# Patient Record
Sex: FEMALE | Race: WHITE | NOT HISPANIC OR LATINO | Employment: FULL TIME | URBAN - METROPOLITAN AREA
[De-identification: names, ages, dates, MRNs, and addresses within clinical notes are randomized per-mention and may not be internally consistent; named-entity substitution may affect disease eponyms.]

---

## 2024-06-18 ENCOUNTER — TELEPHONE (OUTPATIENT)
Dept: NEUROSURGERY | Age: 29
End: 2024-06-18

## 2024-06-28 ENCOUNTER — TELEPHONE (OUTPATIENT)
Dept: NEUROLOGY | Facility: CLINIC | Age: 29
End: 2024-06-28

## 2024-07-10 ENCOUNTER — TELEPHONE (OUTPATIENT)
Dept: SURGERY | Facility: CLINIC | Age: 29
End: 2024-07-10

## 2024-07-10 ENCOUNTER — OFFICE VISIT (OUTPATIENT)
Dept: SURGERY | Facility: CLINIC | Age: 29
End: 2024-07-10
Payer: COMMERCIAL

## 2024-07-10 VITALS
DIASTOLIC BLOOD PRESSURE: 82 MMHG | WEIGHT: 293 LBS | SYSTOLIC BLOOD PRESSURE: 130 MMHG | BODY MASS INDEX: 45.99 KG/M2 | HEART RATE: 97 BPM | HEIGHT: 67 IN

## 2024-07-10 DIAGNOSIS — M54.16 LUMBAR RADICULOPATHY: Primary | ICD-10-CM

## 2024-07-10 RX ORDER — CYCLOBENZAPRINE HCL 10 MG
10 TABLET ORAL
COMMUNITY
Start: 2024-07-05 | End: 2024-07-12

## 2024-07-10 RX ORDER — GABAPENTIN 300 MG/1
1 CAPSULE ORAL 3 TIMES DAILY
COMMUNITY
Start: 2024-06-17

## 2024-07-10 RX ORDER — BUPROPION HYDROCHLORIDE 150 MG/1
150 TABLET, EXTENDED RELEASE ORAL 2 TIMES DAILY
COMMUNITY
Start: 2023-04-01

## 2024-07-10 NOTE — PROGRESS NOTES
[facility]  Neurosurgery Consultation  July 10, 2024    Be Bueno PCP:  Leonel West MD    1995 MRN [id number]     Reason for Visit: RLE radiculopathy     HPI     Be Bueno is a very pleasant 28 year old female who presents today for Neurosurgical consultation for RLE radiculopathy since the beginning of April.  Patient has no weakness but has severe pain and tingling to the RLE, from the hip into the toes. She has numbness to the R lateral calf and dorsum of the R foot. She has a tough time with her daily activities and has had a hard time at work.  She has no weakness. She has no bowel/bladder habit changes.  No prior spine surgeries, PT, or pain services.    MRI shows a large disc bulge at L4-5 with extruded disc with caudal migration.  There is also a large disc bulge with extruded disc at L5-S1 as well.    HISTORY     History reviewed. No pertinent past medical history.   History reviewed. No pertinent surgical history.   History reviewed. No pertinent family history.   Social History     Socioeconomic History    Marital status: Single   Tobacco Use    Smoking status: Every Day     Types: Cigarettes    Smokeless tobacco: Never   Substance and Sexual Activity    Alcohol use: Not Currently    Drug use: Never      No Known Allergies     CURRENT MEDICATIONS     Current Outpatient Medications   Medication Sig Dispense Refill    gabapentin 300 MG Oral Cap Take 1 capsule (300 mg total) by mouth 3 (three) times daily.      cyclobenzaprine 10 MG Oral Tab Take 1 tablet (10 mg total) by mouth.      buPROPion  MG Oral Tablet 12 Hr Take 1 tablet (150 mg total) by mouth 2 (two) times daily.          REVIEW OF SYSTEMS     Comprehensive review of systems done. Negative except what is outlined in the above HPI.     PHYSICAL EXAMIMATION     VITALS:  height is 67\" and weight is 300 lb (136.1 kg). Her blood pressure is 130/82 and her pulse is 97.     GENERAL: No apparent distress,  non-toxic appearing. Sitting comfortably in the examination chair.     MUSCULOSKELETAL: Even tone. Moving bilateral upper and lower extremities spontaneously and against resistance  + (R) SLR,   Pain on Lumbar Flexion:  +  Pain on Lumbar Extension:  +  Pain on Lumbar Rotation:  +     NEURO: Awake, alert and oriented x3. Gait intact, non-antalgic. Intact toe and heel balance on one foot bilaterally, reports equal strength. Able to deep knee bend on one foot bilaterally, reports equal strength. Sensation to light touch on bilateral upper and lower extremities intact.   LOWER EXTREMITY STRENGTH:    Iliospoas  Hamstrings  Quads  D-flexion  P-flexion EHL     Right 5 5 5 5 5 5     Left 5 5 5 5 5 5      DTRs:       Biceps    Triceps   Brachioradialis     Patellar     Ankle    Right       2+         2+            2+         2+        2+    Left       2+         2+             2+         2+        2+      IMAGING:     MRI lumbar spine available in PACS     MEDICAL DECISION MAKING:     ASSESSMENT:  1. Lumbar radiculopathy      PLAN:   Recommend R L4-5 and L5-S1 NETTA    I reviewed the plan of care with the patient at length. All questions and concerns were addressed at this time. The patient verbalized understanding, agrees with the plan, and was appreciative.    Total visit time: 45 minutes; More than 50% spent coordinating care, counseling, reviewing imaging and discussing medication therapy.       Steffi Glynn, MSN, APRN, FNP-[facility]  Neurosurgery   [address] [place] [address], [address]  [place] [address]  [phone]

## 2024-07-10 NOTE — TELEPHONE ENCOUNTER
Pt states she only has ibuprofen for pain control and it isn't helping.  She is asking if something can be prescribed to get her to surgery on 8.6.24.  If appropriate, pls send to: [facility] #[id number] - [address], [phone], [phone] [[id number]]

## 2024-07-10 NOTE — PATIENT INSTRUCTIONS
Refill policies:    Allow 2-3 business days for refills; controlled substances may take longer.  Contact your pharmacy at least 5 days prior to running out of medication and have them send an electronic request or submit request through the “request refill” option in your [employer] account.  Refills are not addressed on weekends; covering physicians do not authorize routine medications on weekends.  No narcotics or controlled substances are refilled after noon on Fridays or by on call physicians.  By law, narcotics must be electronically prescribed.  A 30 day supply with no refills is the maximum allowed.  If your prescription is due for a refill, you may be due for a follow up appointment.  To best provide you care, patients receiving routine medications need to be seen at least once a year.  Patients receiving narcotic/controlled substance medications need to be seen at least once every 3 months.  In the event that your preferred pharmacy does not have the requested medication in stock (e.g. Backordered), it is your responsibility to find another pharmacy that has the requested medication available.  We will gladly send a new prescription to that pharmacy at your request.    Scheduling Tests:    If your physician has ordered radiology tests such as MRI or CT scans, please contact Central Scheduling at [phone] right away to schedule the test.  Once scheduled, the [facility] Centralized Referral Team will work with your insurance carrier to obtain pre-certification or prior authorization.  Depending on your insurance carrier, approval may take 3-10 days.  It is highly recommended patients assure they have received an authorization before having a test performed.  If test is done without insurance authorization, patient may be responsible for the entire amount billed.      Precertification and Prior Authorizations:  If your physician has recommended that you have a procedure or additional testing performed the [facility]  Centralized Referral Team will contact your insurance carrier to obtain pre-certification or prior authorization.    You are strongly encouraged to contact your insurance carrier to verify that your procedure/test has been approved and is a COVERED benefit.  Although the [facility] Centralized Referral Team does its due diligence, the insurance carrier gives the disclaimer that \"Although the procedure is authorized, this does not guarantee payment.\"    Ultimately the patient is responsible for payment.   Thank you for your understanding in this matter.  Paperwork Completion:  If you require FMLA or disability paperwork for your recovery, please make sure to either drop it off or have it faxed to our office at [phone]. Be sure the form has your name and date of birth on it.  The form will be faxed to our Forms Department and they will complete it for you.  There is a 25$ fee for all forms that need to be filled out.  Please be aware there is a 10-14 day turnaround time.  You will need to sign a release of information (CALVIN) form if your paperwork does not come with one.  You may call the Forms Department with any questions at [phone].  Their fax number is [phone].     You are scheduled for Right L4-S1 microdiscectomy on 8.6.24 with  at [facility].     PCP clearance is needed.  We have faxed a request for pre-op clearance to your PCP Dr West. Please contact their office for appointment.      You will need  pre operative labs which will include MRSA/MSSA testing.  You will need to contact the Pre-admission department at [phone] to schedule an appointment for your labs.     The Pre-Admission nurse will review your health history and give you information from the hospital. The nurse will also get you scheduled for all your pre-op testing required for your surgery.     Do not take any blood thinning medications such as over the counter non-steroidal antiinflammatories (advil, aleve, ibuprofen  etc.), herbal supplements (garlic,tumeric etc.), vitamin E, fish oil or krill oil for at least 7 days prior to surgery. You may only take Tylenol, Extra Strength Tylenol, Arthritis Tylenol, or prescription Norco or Tramadol for pain if something is needed.    You should have nothing to eat or drink after 11:00pm the night prior to surgery except for the following:    Do drink 12 ounces of regular Gatorade (NOT RED) 12 hours and 4 hours prior to your scheduled surgery time, Do not drink any other liquids (including water) before your surgery. Do not chew gum or eat candies before surgery.  Take 1000 mg of Tylenol (Acetaminophen) 4 hours before your scheduled surgery time, take this with your scheduled Gatorade.    In order to prevent infection, you will need to purchase Hibiclens soap and use it after your regular body soap for 5 days prior to your procedure.  The last shower should be the night before surgery.  This soap can be found at any pharmacy in the first aid section. See detailed instructions below.      Our office will get prior authorization for surgery through your insurance.     Surgery is usually scheduled as a 23 hour admission.  This is an estimate and varies from person to person.  Ultimately, the surgeon will determine when you are ready to be discharged.    The hospital will contact you 1-2 days before surgery with your arrival time.     If you were on blood thinners (such as Coumadin, Pradaxa, Xarelto, etc) prior to surgery that we had you stop for surgery, please make sure you get instructions about when to resume the medication before you are discharged from the hospital.    Per Dr. Byrne's surgery protocol your appointments are as follows:     2 week pre-op surgical telehealth appointment is on 7.23.24 at 10:30 am with Kiera Norwood RN     1 week pre-op surgical review scheduled on 7.31.24 at 3:40 pm with Dr. Byrne.     1 week post-op appointment scheduled on 8.14.24 at 2:00 pm with Steffi  MARIA M Glynn     4 week post-op appointment scheduled on 9.4.24 at 3:20 om with Dr. Byrne     3 month post surgery appointment scheduled on tbd at tbd with MARIA M Abdullahi       Restrictions for after surgery:  Lifting restriction of 10 pounds or less.  No bending or twisting.  No prolonged sitting or standing.  Driving is restricted for the first 1-2 weeks (this will vary from surgeon to surgeon.)  Fusion patients may require bracing such as TLSO or LSO brace. Braces are custom made to fit the patient.  Patient's are to keep their incisions covered for the first 3 days post surgery. After that they may leave the incision open to air. It is better for the healing process if the incision is left open to air.   May shower after the third day.  Do not scrub the incision and avoid any lotions or creams to the incision.    Please make sure to arrive at least 15 minutes prior to your scheduled appointment in order to get checked in and roomed in a timely manner.  Depending on provider availability, late patients may be required to reschedule.  REFILLS:  After surgery, please remember that we do have a 48 hour refill policy that does not include weekends, please make sure to request your medications in a timely manner so that you do not go days without medication.  *Refills should be requested through your pharmacy or through the refill request in [employer] (log in, go to medications, then select refill request).  [employer] MESSAGING:  Please remember that our office is closed during the weekend and no one is available for [employer] messages. If you have an urgent or emergent matter please go to a walk-in center or the emergency room. Also please remember your [employer] messages are part of your legal medical record and should not be utilized as a personal email with our providers as it is visible to all [facility] employees. Also, Since [employer] messages are not for emergent matters it may be several days before there is a  response to your message.  FMLA/PAPERWORK COMPLETED BY OUR MEDICAL FORMS DEPARTMENT:  If you require FMLA paperwork for your surgery, please make sure to either Drop it off or have it faxed to our office at [phone]. Make sure it has your NAME, , and has your signature. You will need to have a Release of Information on file. To facilitate this process we ask that you requested it at the  on your way out and sign it. Without a signed CALVIN or signature on the form we will not be able to fax it and this will cause a delay with your forms. Fees charged for forms are $25 for initial submission and $15 for recertifications. If you have questions on the status of your forms please call the forms department at [phone].  **We do have a 3 week policy for all forms and paperwork, please make sure to allow plenty of time for completion. Same day paperwork will not be completed. **    Spine Navigator  You will have a 30 minute telehealth visit with our spine navigator approximately 2 weeks before your surgery. This visit is NOT optional. This appointment will get booked when you schedule your spinal surgery.  When speaking with Pre-admissions you will be told about a spine class as it relates to your surgery, this is an OPTIONAL class. The same or similar information will be discussed with you during your telehealth appointment with the spine navigator (Spine Navigator appointment is not optional). However, if you would like to have this information repeated to you or if you would feel more comfortable with additional information, you can elect to schedule this class during your pre-admissions phone call.  The Pre-op Spine Surgery Class is held at [facility] most  from 3:30-4:30 pm. Call Pre-admission Testing (PAT) to register at:  [phone]. Please park in the [facility] Parking garage, check in at registration and meet in the [facility] lobby for your escort to class on the Ortho Spine unit. If  unable to attend, but would like additional information, the class is available online at [email or web address].     Regarding current visitor information:    Please visit the following website for the detailed and up-to-date visitor screening and restriction policy at [facility] [email or web address].    Hibiclens Bathing  Hibiclens is a body soap that is used before surgery protect you from getting an infection after surgery   Hibiclens comes in a large blue bottle and can be found in most pharmacies in the First Aid supplies  Shower with this daily for FIVE consecutive days before surgery, using the entire bottle over the five days.  The last shower should be the night before surgery.   Steps to bathing with Hibiclens  Do not use Hibiclens on your hair, face or private areas  Wash your hair and face as normal with your usual cleansers  Rinse well  Using a clean wet washcloth apply enough Hibiclens to cover your body. Wash from the neck down avoiding the genital areas and concentrating on the surgical area  Rinse well  Dry yourself with a clean, dry towel  Do not use any powders, creams, lotions or sprays on your body as these attract bacteria  Deodorant and facial creams are acceptable.   (Laundering/cleaning: Chlorhexidine gluconate skin cleansers will cause stains if used with chlorine releasing products. Rinse completely and use only non-chlorine detergents.)    For Office Use Only:    Medical Clearances Needed: PCP  PA: IAN  CPT Codes:

## 2024-07-10 NOTE — PROGRESS NOTES
New patient:  Reason for visit:   R sided low back pain, R leg pain with numbness/tingling on R foot    Numeric Rating Scale:        Pain at Present:  3/10                                                                                                              Past Treatments for Current Pain Condition:     PT-no relief   Norco, oral steroids- no relief      Prior diagnostic testing related to this condition:    MRI spine lumbar DOS 06/2024 uploaded to pacs

## 2024-07-11 ENCOUNTER — TELEPHONE (OUTPATIENT)
Dept: SURGERY | Facility: CLINIC | Age: 29
End: 2024-07-11

## 2024-07-11 DIAGNOSIS — M54.16 LUMBAR RADICULOPATHY: Primary | ICD-10-CM

## 2024-07-11 RX ORDER — GABAPENTIN 100 MG/1
100 CAPSULE ORAL 3 TIMES DAILY
Qty: 30 CAPSULE | Refills: 0 | Status: SHIPPED | OUTPATIENT
Start: 2024-07-11 | End: 2024-07-15 | Stop reason: DRUGHIGH

## 2024-07-11 RX ORDER — CYCLOBENZAPRINE HCL 10 MG
10 TABLET ORAL 2 TIMES DAILY PRN
Qty: 30 TABLET | Refills: 0 | Status: SHIPPED | OUTPATIENT
Start: 2024-07-11 | End: 2024-07-12

## 2024-07-11 RX ORDER — MELOXICAM 7.5 MG/1
15 TABLET ORAL DAILY
Qty: 30 TABLET | Refills: 0 | Status: SHIPPED | OUTPATIENT
Start: 2024-07-11 | End: 2024-07-12

## 2024-07-11 NOTE — TELEPHONE ENCOUNTER
The following medications have been sent to patient's preferred [facility] pharmacy:    Gabapentin 100 mg TID. Can increase to 200 mg TID after 3 days if no improvement.  Meloxicam 15 mg (2 tablets) daily. Recommend taking medication with breakfast. Patient should avoid taking other NSAIDSS while taking this medication.  Cyclobenzaprine BID PRN. Patient should not drive or operate machinery until seeing how this medication affects her, as it can cause drowsiness.    Please advise patient of the above. Thank you!

## 2024-07-11 NOTE — TELEPHONE ENCOUNTER
You are scheduled for Right L4-S1 microdiscectomy on 8.6.24 with  at [facility].     PCP clearance is needed.  We have faxed a request for pre-op clearance to your PCP Dr West. Please contact their office for appointment.      You will need  pre operative labs which will include MRSA/MSSA testing.  You will need to contact the Pre-admission department at [phone] to schedule an appointment for your labs.     The Pre-Admission nurse will review your health history and give you information from the hospital. The nurse will also get you scheduled for all your pre-op testing required for your surgery.     Do not take any blood thinning medications such as over the counter non-steroidal antiinflammatories (advil, aleve, ibuprofen etc.), herbal supplements (garlic,tumeric etc.), vitamin E, fish oil or krill oil for at least 7 days prior to surgery. You may only take Tylenol, Extra Strength Tylenol, Arthritis Tylenol, or prescription Norco or Tramadol for pain if something is needed.    You should have nothing to eat or drink after 11:00pm the night prior to surgery except for the following:    Do drink 12 ounces of regular Gatorade (NOT RED) 12 hours and 4 hours prior to your scheduled surgery time, Do not drink any other liquids (including water) before your surgery. Do not chew gum or eat candies before surgery.  Take 1000 mg of Tylenol (Acetaminophen) 4 hours before your scheduled surgery time, take this with your scheduled Gatorade.    In order to prevent infection, you will need to purchase Hibiclens soap and use it after your regular body soap for 5 days prior to your procedure.  The last shower should be the night before surgery.  This soap can be found at any pharmacy in the first aid section. See detailed instructions below.      Our office will get prior authorization for surgery through your insurance.     Surgery is usually scheduled as a 23 hour admission.  This is an estimate and varies from  person to person.  Ultimately, the surgeon will determine when you are ready to be discharged.    The hospital will contact you 1-2 days before surgery with your arrival time.     If you were on blood thinners (such as Coumadin, Pradaxa, Xarelto, etc) prior to surgery that we had you stop for surgery, please make sure you get instructions about when to resume the medication before you are discharged from the hospital.    Per Dr. Byrne's surgery protocol your appointments are as follows:     2 week pre-op surgical telehealth appointment is on 7.23.24 at 10:30 am with Kiera Norwood RN     1 week pre-op surgical review scheduled on 7.31.24 at 3:40 pm with Dr. Byrne.     1 week post-op appointment scheduled on 8.14.24 at 2:00 pm with MARIA M Abdullahi     4 week post-op appointment scheduled on 9.4.24 at 3:20 om with Dr. Byrne     3 month post surgery appointment scheduled on tbd at tbd with MARIA M Abdullahi       Restrictions for after surgery:  Lifting restriction of 10 pounds or less.  No bending or twisting.  No prolonged sitting or standing.  Driving is restricted for the first 1-2 weeks (this will vary from surgeon to surgeon.)  Fusion patients may require bracing such as TLSO or LSO brace. Braces are custom made to fit the patient.  Patient's are to keep their incisions covered for the first 3 days post surgery. After that they may leave the incision open to air. It is better for the healing process if the incision is left open to air.   May shower after the third day.  Do not scrub the incision and avoid any lotions or creams to the incision.    Please make sure to arrive at least 15 minutes prior to your scheduled appointment in order to get checked in and roomed in a timely manner.  Depending on provider availability, late patients may be required to reschedule.  REFILLS:  After surgery, please remember that we do have a 48 hour refill policy that does not include weekends, please make sure to request your  medications in a timely manner so that you do not go days without medication.  *Refills should be requested through your pharmacy or through the refill request in [employer] (log in, go to medications, then select refill request).  [employer] MESSAGING:  Please remember that our office is closed during the weekend and no one is available for [employer] messages. If you have an urgent or emergent matter please go to a walk-in center or the emergency room. Also please remember your [employer] messages are part of your legal medical record and should not be utilized as a personal email with our providers as it is visible to all [facility] employees. Also, Since [employer] messages are not for emergent matters it may be several days before there is a response to your message.  FMLA/PAPERWORK COMPLETED BY OUR MEDICAL FORMS DEPARTMENT:  If you require FMLA paperwork for your surgery, please make sure to either Drop it off or have it faxed to our office at [phone]. Make sure it has your NAME, , and has your signature. You will need to have a Release of Information on file. To facilitate this process we ask that you requested it at the  on your way out and sign it. Without a signed CALVIN or signature on the form we will not be able to fax it and this will cause a delay with your forms. Fees charged for forms are $25 for initial submission and $15 for recertifications. If you have questions on the status of your forms please call the forms department at [phone].  **We do have a 3 week policy for all forms and paperwork, please make sure to allow plenty of time for completion. Same day paperwork will not be completed. **    Spine Navigator  You will have a 30 minute telehealth visit with our spine navigator approximately 2 weeks before your surgery. This visit is NOT optional. This appointment will get booked when you schedule your spinal surgery.  When speaking with Pre-admissions you will be told about a spine  class as it relates to your surgery, this is an OPTIONAL class. The same or similar information will be discussed with you during your telehealth appointment with the spine navigator (Spine Navigator appointment is not optional). However, if you would like to have this information repeated to you or if you would feel more comfortable with additional information, you can elect to schedule this class during your pre-admissions phone call.  The Pre-op Spine Surgery Class is held at [facility] most Wednesdays from 3:30-4:30 pm. Call Pre-admission Testing (PAT) to register at:  [phone]. Please park in the [facility] Parking garage, check in at registration and meet in the [facility] lobby for your escort to class on the Ortho Spine unit. If unable to attend, but would like additional information, the class is available online at [email or web address].     Regarding current visitor information:    Please visit the following website for the detailed and up-to-date visitor screening and restriction policy at [email or web address] [email or web address].    Hibiclens Bathing  Hibiclens is a body soap that is used before surgery protect you from getting an infection after surgery   Hibiclens comes in a large blue bottle and can be found in most pharmacies in the First Aid supplies  Shower with this daily for FIVE consecutive days before surgery, using the entire bottle over the five days.  The last shower should be the night before surgery.   Steps to bathing with Hibiclens  Do not use Hibiclens on your hair, face or private areas  Wash your hair and face as normal with your usual cleansers  Rinse well  Using a clean wet washcloth apply enough Hibiclens to cover your body. Wash from the neck down avoiding the genital areas and concentrating on the surgical area  Rinse well  Dry yourself with a clean, dry towel  Do not use any powders, creams, lotions or sprays on your body as these attract  bacteria  Deodorant and facial creams are acceptable.   (Laundering/cleaning: Chlorhexidine gluconate skin cleansers will cause stains if used with chlorine releasing products. Rinse completely and use only non-chlorine detergents.)    For Office Use Only:    Medical Clearances Needed: PCP  PA: IAN  CPT Codes:

## 2024-07-11 NOTE — TELEPHONE ENCOUNTER
Message from Provider received.    Advised pt. with Providers message.     Message was sent through [employer] message.

## 2024-07-11 NOTE — TELEPHONE ENCOUNTER
Message noted regarding prescription request.    LOV 07/10/24. Note not complete yet.     Appointment note stated Pt. has lumbar radiculopathy. L4/L5 descending, severe canal stenosis.     Routed to MARIA M Bales.

## 2024-07-12 RX ORDER — METHYLPREDNISOLONE 4 MG/1
TABLET ORAL
Qty: 1 EACH | Refills: 0 | Status: SHIPPED | OUTPATIENT
Start: 2024-07-12

## 2024-07-12 RX ORDER — CYCLOBENZAPRINE HCL 10 MG
10 TABLET ORAL 3 TIMES DAILY PRN
Qty: 30 TABLET | Refills: 1 | Status: SHIPPED | OUTPATIENT
Start: 2024-07-12

## 2024-07-12 NOTE — TELEPHONE ENCOUNTER
Called patient to discuss medication therapy. She endorses nausea and no relief of pain with Meloxicam 15 mg daily. She has been taking gabapentin 300 mg TID to minimal relief. She does report relief with Flexeril TID and PRN Tramadol - has 50 mg tabs at home.     New Rx for Flexeril TID has been provided. I called patient's preferred [facility] pharmacy and confirmed receipt of the prescription. They have notified me that the will fill this script for her today.    Rx for medrol pack provided.    Recommend increasing gabapentin to 300 mg AM, 300 mg in afternoon, and 600 mg nightly. Patient has enough medication and declines need for refill at this time.     The patient was advised to present to the ED over the weekend if she experiences any new neurological symptoms or if her pain is intractable despite the medication changes above.  She voiced understanding and was very appreciative of the phone call.

## 2024-07-12 NOTE — TELEPHONE ENCOUNTER
Patient has messaged and responded that she has been taking 300 mg Gabapentin TID, and recommendations for medication order per MARIA M Bales is to increase Gabapentin strength to 200 mg TID.     Patient states Meloxicam \"does not work and gives me headaches and makes me nauseous\".    Patient also has Cyclobenzaprine ordered:    Medication Quantity Refills Start End   cyclobenzaprine 10 MG Oral Tab 30 tablet 0 7/11/2024 --   Sig:   Take 1 tablet (10 mg total) by mouth 2 (two) times daily as needed for Muscle spasms.     Route:   Oral     PRN Reason(s):   Muscle spasms       Per Dr. Byrne and MARIA M Abdullahi at OV on 7/10/24:      \"PLAN:   Recommend R L4-5 and L5-S1 NETTA           Progress Notes  Rita Taylor MA (Medical Assistant)  New patient:  Reason for visit:   R sided low back pain, R leg pain with numbness/tingling on R foot     Numeric Rating Scale:        Pain at Present:  3/10                                                                                                              Past Treatments for Current Pain Condition:     PT-no relief   Norco, oral steroids- no relief\"         Routed to MARIA M Bales.

## 2024-07-15 PROBLEM — M25.50 MULTIPLE JOINT PAIN: Status: ACTIVE | Noted: 2017-11-03

## 2024-07-15 PROBLEM — G89.29 CHRONIC MIDLINE LOW BACK PAIN WITH LEFT-SIDED SCIATICA: Status: ACTIVE | Noted: 2023-06-28

## 2024-07-15 PROBLEM — M54.42 CHRONIC MIDLINE LOW BACK PAIN WITH LEFT-SIDED SCIATICA: Status: ACTIVE | Noted: 2023-06-28

## 2024-07-15 PROBLEM — M54.41 ACUTE RIGHT-SIDED LOW BACK PAIN WITH RIGHT-SIDED SCIATICA: Status: ACTIVE | Noted: 2024-05-21

## 2024-07-15 PROBLEM — M54.16 LUMBAR RADICULOPATHY: Status: ACTIVE | Noted: 2023-06-28

## 2024-07-15 PROBLEM — O47.9: Status: ACTIVE | Noted: 2022-02-05

## 2024-07-15 PROBLEM — F32.A DEPRESSION: Status: ACTIVE | Noted: 2023-06-28

## 2024-07-15 NOTE — TELEPHONE ENCOUNTER
Patient is scheduled for Right L4-S1 microdiscectomy on 8.6.24 with  at [facility].        Y pre-op apt scheduled (if sx is more than 30 days from last apt)  Ypre-op apt scheduled with RN spine navigator  Y Surgical instructions reviewed by nursing staff with patient  Y  form completed  Y Surgery order signed   Y Placed sx on surgery sheet  Y Placed on outlook calendar  Y BRAINDIGITt message sent to patient with sx instructions  Y Faxed pre-op clearance request to PCP Dr. West   N/A Faxed letter to prescribing provider requesting anticoagulants be held for surgery  Y E-mail sent to [employer]  Y Post-op appointments made  Y. Routed to PA team to initiate.  Y Post-Op outreach pt reminder placed.   Y Entire Neurosurgery Checklist Completed    Clearances: PCP - NEEDED  PA: [employer] - NEEDED  CPT Codes: 78781, 83446.

## 2024-07-17 NOTE — TELEPHONE ENCOUNTER
Prior authorization request completed for: Right L4-S1 microdiscectomy    Authorization #[id number]  Authorization dates: 08/06/24  CPT codes approved: 64728, 01283  Number of visits/dates of service approved: Outpatient   Physician: Caty   Location: [facility]     Call Ref#: [id number]  Representative Name: Maxx CHAN  Insurance Carrier: [id number]

## 2024-07-17 NOTE — TELEPHONE ENCOUNTER
PCP presurgical clearance received per OV note dated 7.11.24 in chart, \"She is medically stable to proceed with the planned procedure  Further preoperative evaluation is not needed\"

## 2024-07-23 ENCOUNTER — HOSPITAL ENCOUNTER (OUTPATIENT)
Dept: GENERAL RADIOLOGY | Facility: HOSPITAL | Age: 29
Discharge: HOME | End: 2024-07-23
Attending: NEUROLOGICAL SURGERY
Payer: COMMERCIAL

## 2024-07-23 ENCOUNTER — LABORATORY ENCOUNTER (OUTPATIENT)
Dept: LAB | Facility: HOSPITAL | Age: 29
End: 2024-07-23
Attending: NEUROLOGICAL SURGERY
Payer: COMMERCIAL

## 2024-07-23 ENCOUNTER — NURSE ONLY (OUTPATIENT)
Dept: SURGERY | Facility: CLINIC | Age: 29
End: 2024-07-23
Payer: COMMERCIAL

## 2024-07-23 DIAGNOSIS — M54.16 LUMBAR RADICULOPATHY: ICD-10-CM

## 2024-07-23 DIAGNOSIS — Z71.9 ENCOUNTER FOR EDUCATION: Primary | ICD-10-CM

## 2024-07-23 LAB
ANTIBODY SCREEN: NEGATIVE
APTT PPP: 29.6 SECONDS (ref 23–36)
BILIRUB UR QL STRIP.AUTO: NEGATIVE
CLARITY UR REFRACT.AUTO: CLEAR
COLOR UR AUTO: COLORLESS
GLUCOSE UR STRIP.AUTO-MCNC: NORMAL MG/DL
INR BLD: 0.9 (ref 0.8–1.2)
KETONES UR STRIP.AUTO-MCNC: NEGATIVE MG/DL
LEUKOCYTE ESTERASE UR QL STRIP.AUTO: NEGATIVE
NITRITE UR QL STRIP.AUTO: NEGATIVE
PH UR STRIP.AUTO: 5.5 [PH] (ref 5–8)
PROT UR STRIP.AUTO-MCNC: NEGATIVE MG/DL
PROTHROMBIN TIME: 12.1 SECONDS (ref 11.6–14.8)
RBC UR QL AUTO: NEGATIVE
RH BLOOD TYPE: POSITIVE
SP GR UR STRIP.AUTO: 1.01 (ref 1–1.03)
UROBILINOGEN UR STRIP.AUTO-MCNC: NORMAL MG/DL

## 2024-07-23 PROCEDURE — 86901 BLOOD TYPING SEROLOGIC RH(D): CPT

## 2024-07-23 PROCEDURE — 86900 BLOOD TYPING SEROLOGIC ABO: CPT

## 2024-07-23 PROCEDURE — 85610 PROTHROMBIN TIME: CPT

## 2024-07-23 PROCEDURE — 86850 RBC ANTIBODY SCREEN: CPT

## 2024-07-23 PROCEDURE — 85730 THROMBOPLASTIN TIME PARTIAL: CPT

## 2024-07-23 PROCEDURE — 71046 X-RAY EXAM CHEST 2 VIEWS: CPT | Performed by: NEUROLOGICAL SURGERY

## 2024-07-23 PROCEDURE — 81003 URINALYSIS AUTO W/O SCOPE: CPT

## 2024-07-23 PROCEDURE — 36415 COLL VENOUS BLD VENIPUNCTURE: CPT

## 2024-07-23 RX ORDER — CYCLOBENZAPRINE HCL 10 MG
10 TABLET ORAL 3 TIMES DAILY PRN
Qty: 30 TABLET | Refills: 1 | Status: SHIPPED | OUTPATIENT
Start: 2024-07-23

## 2024-07-23 NOTE — TELEPHONE ENCOUNTER
Medication: Cyclobenzaprine 10 MG    Last Refill: 7/12/24    Last office visit: 7/10/24    Date of next office visit scheduled: 7/31/24    Last office visit note recommendation:  \"PLAN:   Recommend R L4-5 and L5-S1 NETTA\"

## 2024-07-23 NOTE — PROGRESS NOTES
RN Spine Navigator Education for [facility]     If you have received instructions from your surgeon that are different from those listed below, please follow your physician's instructions.    You are scheduled for a Microdiscectomy with Dr. Byrne on 8/6.      Patient Surgical Goals: Decreased right leg symptoms.     Before Your Surgery    Choose a Care Partner  Patient attended spine navigator visit independently.  Care partner is Jose Rowe. Your care partner(s) should be able to provide transportation to and from the hospital. They should be able to help at home for the first week after discharge, including helping you with meals, medication, and dressing changes.    Clearance Before Surgery  You will need to see your primary care provider within 30 days before surgery. Please make sure this appointment is at least a week before surgery as more testing or doctor visits may be ordered. Presurgical testing may include labs, nasal swab, imaging, EKG.   Make sure that you complete all preadmission testing so that surgery does not get delayed.    Home Environment  Assessed home status: home has stairs, bedroom and bathroom are on first floor, patient has dependents at home, and patient has pets. Suggested arrangements for childcare  and pet care.  It is recommended that you prepare your home by putting clean sheets on your bed, freezing meals, and putting frequently used items at waist level.   Prevent falls by removing items that could cause you to trip, adding nightlights and adding a nonskid mat in shower.  Assistive devices can be purchased at a medical supply store or online including canes, walkers, toileting devices (commodes, raised toilet seats, toilet paper wiping aid), long handled sponge, shower chair or tub transfer bench, grabber/reacher tool, sock aid, long handled shoehorn, if needed.    Tobacco, Nicotine and Marijuana Use   Educated on the importance of nicotine cessation and Smoking any amount or use  of nicotine products can delay or prevent healing. Do not use nicotine products for at least two weeks after surgery    Medications to Stop   For 7-10 days before surgery do not take any blood thinning medications. This includes non-steroidal anti-inflammatories or NSAIDs (Advil, Aleve, Motrin, ibuprofen, naproxen, meloxicam, diclofenac, celecoxib, etc.), aspirin (unless told otherwise by your cardiologist or surgeon), herbal supplements and vitamins (garlic, turmeric, vitamin E, fish oil or krill oil, etc.). You may only take Tylenol or prescribed narcotic medication if needed for pain.   Other medications to stop include: none    Leading Up to Day of Surgery  Five days before surgery wash with Hibiclens soap after your regular body soap every day. Do not put use Hibiclens on your face, hair or privates. Your last shower should be the night before surgery.  One-two business days before surgery, the preadmission testing staff will call you and let you know what time to arrive, where to park, when to take your Tylenol and Gatorade, and will provide any additional instructions.   After 11pm the day before surgery, do not eat or drink anything (including water, gum, or candies) except for Tylenol and Gatorade.   Drink 12 ounces of regular Gatorade (NOT RED) 12 hours prior to your scheduled surgery time. Drink your second 12 ounces of regular Gatorade and take 1000 mg of Tylenol (acetaminophen) 4 hours before your scheduled surgery time.     Items to Bring to the Hospital   Bring insurance card, ID, advanced directive, or medical power of  paperwork, loose fitting clothing, shoes with a back that can accommodate swollen feet, long phone charging cord.   Do not bring jewelry, valuables, or medications.   If you take an uncommon medication that the hospital may not have, it must be brought to the hospital in the original container, and you must notify the nurse of this medication.     In the Hospital     Operative  Day and Hospital Stay  In the preoperative area, you will change into a gown, have an IV placed in your hand or arm by the nurse, and sign any consent paperwork that is needed for your procedure. You will speak to the surgeon and anesthesiologist. It is important to tell the doctors and nurses if you have had any significant side effects from pain medications or anesthesia such as a rash or severe nausea.    In the operating room, the anesthesiologist will attach monitors, give you oxygen through a mask, and give you medicine through the IV to fall asleep. After you are sleeping, the breathing tube will be placed. The equipment will be hooked up and removed while you are asleep. You will wake up on the hospital bed.     During the surgery, your care partner can sit in the surgical waiting area. There are TV screens in that area that keep them informed of your progress. If the procedure is at [facility], there is a person who can speak to them about your surgical progress.     In the recovery room, monitors will be attached that check your heart rate, blood pressure and oxygen levels. While you may not remember this part, a nurse is with you and constantly checking on your condition. Medications for pain and nausea will be given if you need them. You may have a melo catheter to empty your bladder or a drain at your surgical site. Your family is not allowed in the recovery room. When you are ready to leave the recovery room, you will be transported on your bed to the inpatient unit accompanied by your family once a room is available.  On the inpatient unit, a team of doctors and advanced practice providers will manage your care. The spine care nurses will check your blood pressure, temperature, heartbeat, breathing, stomach sounds and your incision. They may assess the strength and sensation in your arms and legs. Medications that are given in the hospital include antibiotics, IV fluids, pain medications, muscle  relaxers, stool softeners, and your home medications. You may get blood drawn and another x-ray. Physical and occupational therapists may come to your room to teach you how to move around safely after surgery.     Post Op Plan   The average length of hospital stay is one to two days. A  may visit you to help arrange extra care for you once you leave the hospital. Occasionally, it is recommended that a home health nurse or therapist visit you in your home for a short time. The best place to recover is your own home, but if you need more assistance than home health and your care partner can provide, the  will help you and your family choose other facilities to help you recover your strength.    Preventing surgical complications  It is important to follow all instructions before and after surgery to decrease the risks of surgery and prevent complications.     Blood clots: walk, wear leg compression devices in the hospital, and do ankle pumps at home  Infection: wash with Hibiclens before surgery, wash your hands, don't touch your incision  Pneumonia: take deep breaths and cough and use the breathing exercise (incentive spirometer)   Nausea/vomiting: start with liquids and small meals and do not take pills on an empty stomach  Constipation: drink water and walk frequently    Tell your nurse if you are experiencing nausea, vomiting or constipation as they have medications to help treat these.      At home     Understanding Pain After Surgery  We will do our best to manage your pain after surgery, but it is not possible to be completely pain-free. There will be operative pain in your back or neck. Pain in the arms or legs may be one of the first things to improve. Numbness, weakness, and tingling should improve over time. However, there can be a temporary increase in symptoms in the first few days due to inflammation from surgery.   Pain medications will be prescribed to take home at discharge. The  goal of pain medicine after surgery is to make your pain tolerable, not to make you pain free. We encourage you to use the medication prescribed to you after your surgery, but please take the lowest possible dose to manage your pain. Taking high doses of narcotics can cause side effects. Transition to plain Tylenol when your pain improves. At [facility], your prescriptions can be filled by our pharmacy and brought to you bedside. You may get more continuous pain relief by alternating between medications if you have multiple instead of taking them at the same time. Write down when you have taken a medication as it may be difficult to remember after a few doses.    Post operative medication   Tylenol (acetaminophen): take for pain. Do not take more than 4000 mg in 24 hours because it can damage your liver.   Narcotics: take for moderate to severe pain. Do not drink alcohol or drive while taking narcotics. Some narcotic medications (Norco, Tylenol #3, Percocet, Vicodin) contain Tylenol (acetaminophen). Make sure to not exceed the maximum dose if you are taking additional Tylenol with these medications.  Muscle relaxers: take for muscle cramping. These can cause drowsiness.    NSAIDS (Advil, Aleve, Motrin, ibuprofen, naproxen, meloxicam, diclofenac, celecoxib, etc.) or aspirin: Do not take these unless your physician says it is ok.  Stool softeners: take to prevent constipation while you are taking narcotic medications. You can get these over the counter at the pharmacy. You may use laxatives, which are stronger, if needed.    If you believe you will need more of medication your surgeon has prescribed to you, request a refill through your pharmacy or through the refill request in [facility] (log in, go to medications, then select refill request) at least two business days before you run out of medication.     Nonpharmacologic pain management   You may use ice on your incision for 20 minutes every hour to help with  pain and swelling. Do not place ice directly on your skin. You can use heat to sooth your muscles but avoid placing heat directly on your incision. Make frequent position changes. You can do gentle stretching while avoiding significant bending or twisting. Use deep breathing techniques and distractions such as TV, music, reading, or games.     Movement restrictions  After surgery, no bending or twisting your neck or back. Do not lift anything over 10lbs (about the weight of a gallon of milk) or lift anything over your shoulders. Avoid pulling or pushing. You may use stairs while holding the handrail.  It is ok to ride in the car but refrain from driving or traveling long distances until cleared to do so by your surgeon. You may not drive while taking narcotics or muscle relaxants.    Post Op Exercise   Walk frequently. Start with walking short distances and increase as you start to feel better. Do ankle pumps (bending at your ankles, bring your feet towards your head then point them towards the ground) 15-20 times every hour when awake to help prevent blood clots.     Your surgeon will let you know at your post operative appointment if you are ready to decrease your movement restrictions and increase your exercise. If you have questions in between appointments about lessening your restrictions, please contact the office.     You and your doctor will discuss how you are feeling as you heal and decide if outpatient physical therapy or a medical fitness referral is needed.    Caring for your incision  Always wash your hands before touching your incision. Your incision will be closed with sutures under the skin and skin glue or Steri-Strips (thin white bandages) on top of the skin. Do not attempt to peal off Skin glue/Steri-Strips as they will come off on their own. If the incision is closed with sutures or staples on top of the skin, they will be removed at a post op appointment. The incision may be covered with a  gauze dressing that can come off after three days. Once the original gauze dressing is removed, we prefer that you leave your incision open to air (without a gauze dressing). If the incision has drainage or is rubbing against your clothes or brace, you may place gauze and medical tape over it. Change the gauze and medical tape daily. Look at your incision daily to check for signs of infection.   You can shower three days after your surgery or sooner if your surgeon allows. We recommend the care partner be present during the first shower for safety. Let soapy water run over the incision, but do not scrub it or spray it directly. Gently pat it dry after with a clean towel. Do not apply any creams or lotions to the incision. Do not soak in a tub, pool, or any body of water until your incision is fully healed.    Signs of Infection   Check your incision daily for swelling, redness, drainage, pus, bad smell, or opening of the incision.     When to Call for Assistance  Call the Neurosurgery Office ([phone] Option #2) if you experience signs of infection, opening of the incision, continuous nausea or vomiting, poor pain control despite using the pain medication as directed, a sudden increase in pain, temperature over 101F, difficulty swallowing, leg swelling, or with any concerns, unanswered questions, or new problems, such as new numbness/weakness/tingling.  Call 911 or go to the nearest emergency room if you experience chest pain, difficulty breathing, loss of bowel or bladder control, extreme drowsiness, or any other life-threatening situation.     Follow-up Plan   Appointments with Dr. Byrne or Steffi at 1, 4 and 12 weeks    Answered questions regarding: Returning to work is based on what you do and Dr. Byrne's instructions.      You can contact the RN Spine Navigator at [phone] or [email or web address] with additional questions or feedback on your care. It may take several business days to receive a reply so  please do not call the RN spine navigator for refills or for emergencies.    Spine navigator spent 25 minutes conducting a virtual visit to provide education. Thank you for letting the RN Spine Navigator participate in your care.

## 2024-07-26 ENCOUNTER — PATIENT MESSAGE (OUTPATIENT)
Dept: SURGERY | Facility: CLINIC | Age: 29
End: 2024-07-26

## 2024-07-26 NOTE — TELEPHONE ENCOUNTER
From: Be Bueno  To: Lesley Byrne  Sent: 7/26/2024 8:24 AM CDT  Subject: Medication    I need something for this pain, nothing is working, I can barely walk or stand, even in a rest position sitting or laying down. I can’t do this the pain is constantly there.

## 2024-07-26 NOTE — TELEPHONE ENCOUNTER
Call placed to pt to inquire and assess.    Pt states that she has sx scheduled for 8.6.24, however says she has unmanageable pain.    She has taken all medications available but has no relief, including medrol pack.    Pt states pain was so bad, she told her mom she needs to go to ER, mom brought over some of dads pain medication which is believed to be norco, and after a few doses pain has not be relieved in the slightest and is in fact increasing in severity. Pt unable to sleep or find any pain-free position.    Pt states her pain is baseline now 7-8/10, constantly, Starts as a sharp stabbing in R hip. Radiates down to her pelvic and naval area, then to her R thigh and finally knee.    APRN was called to discuss, advised pt present to the ED with intractable pain for stabilization and evaluation.    Pt called, agreed and acknowledged plan, and will have family transport her to ED, preferably [facility].    Routed update to YULIANA to inform.

## 2024-07-28 ENCOUNTER — HOSPITAL ENCOUNTER (EMERGENCY)
Facility: HOSPITAL | Age: 29
Discharge: HOME | End: 2024-07-29
Attending: EMERGENCY MEDICINE
Payer: COMMERCIAL

## 2024-07-28 DIAGNOSIS — M54.16 LUMBAR RADICULOPATHY: Primary | ICD-10-CM

## 2024-07-28 LAB — B-HCG UR QL: NEGATIVE

## 2024-07-28 PROCEDURE — 99284 EMERGENCY DEPT VISIT MOD MDM: CPT

## 2024-07-28 PROCEDURE — 81025 URINE PREGNANCY TEST: CPT

## 2024-07-28 PROCEDURE — 96374 THER/PROPH/DIAG INJ IV PUSH: CPT

## 2024-07-28 PROCEDURE — 96375 TX/PRO/DX INJ NEW DRUG ADDON: CPT

## 2024-07-28 PROCEDURE — 99285 EMERGENCY DEPT VISIT HI MDM: CPT

## 2024-07-28 RX ORDER — MORPHINE SULFATE 4 MG/ML
8 INJECTION, SOLUTION INTRAMUSCULAR; INTRAVENOUS ONCE
Status: COMPLETED | OUTPATIENT
Start: 2024-07-28 | End: 2024-07-28

## 2024-07-28 RX ORDER — ONDANSETRON 2 MG/ML
4 INJECTION INTRAMUSCULAR; INTRAVENOUS ONCE
Status: COMPLETED | OUTPATIENT
Start: 2024-07-28 | End: 2024-07-28

## 2024-07-28 RX ORDER — KETOROLAC TROMETHAMINE 15 MG/ML
15 INJECTION, SOLUTION INTRAMUSCULAR; INTRAVENOUS ONCE
Status: COMPLETED | OUTPATIENT
Start: 2024-07-28 | End: 2024-07-28

## 2024-07-29 VITALS
WEIGHT: 293 LBS | BODY MASS INDEX: 47 KG/M2 | OXYGEN SATURATION: 99 % | TEMPERATURE: 98 F | SYSTOLIC BLOOD PRESSURE: 140 MMHG | HEART RATE: 80 BPM | RESPIRATION RATE: 16 BRPM | DIASTOLIC BLOOD PRESSURE: 81 MMHG

## 2024-07-29 RX ORDER — OXYCODONE HYDROCHLORIDE AND ACETAMINOPHEN 5; 325 MG/1; MG/1
1-2 TABLET ORAL EVERY 6 HOURS PRN
Qty: 20 TABLET | Refills: 0 | Status: ON HOLD | OUTPATIENT
Start: 2024-07-29 | End: 2024-08-03

## 2024-07-29 NOTE — ED PROVIDER NOTES
Patient Seen in: [facility] Emergency Department      History     Chief Complaint   Patient presents with    Back Pain     Stated Complaint: chronic back pain, due for surgery 24. increased pain    Subjective:   HPI    Patient is a 28-year-old female presents to ED for evaluation of low back pain radicular in nature.  Patient has been having pain since April that became constant and made.  Had outpatient MRI at another facility in  showing central disc herniations.  Seen by spine surgery team here and scheduled for surgery on .  Patient states she has chronic pain right lower back, hip that radiates down her right leg with tingling in her toes.  She denies fever.  No bowel or bladder incontinence.  No urinary retention.  No IV drug use.  Patient states she has taken 4 Vicodin tablets over the last 3 days that only helped mildly.  She called her spine surgeon office 2 days ago and was told to come to the ED for pain control but did not.    Objective:   Past Medical History:    Back problem    Depression    Muscle weakness    RIGHT LEG              Past Surgical History:   Procedure Laterality Date          Tonsillectomy                  Social History     Socioeconomic History    Marital status: Single   Tobacco Use    Smoking status: Every Day     Current packs/day: 1.00     Types: Cigarettes    Smokeless tobacco: Never   Vaping Use    Vaping status: Never Used   Substance and Sexual Activity    Alcohol use: Not Currently    Drug use: Never              Review of Systems    Positive for stated Chief Complaint: Back Pain    Other systems are as noted in HPI.  Constitutional and vital signs reviewed.      All other systems reviewed and negative except as noted above.    Physical Exam     ED Triage Vitals   BP 24 2330 147/85   Pulse 24 2234 107   Resp 24 2234 20   Temp 24 2234 98.1 °F (36.7 °C)   Temp src 24 2234 Oral   SpO2 24 223 100 %   O2 Device  07/28/24 2234 None (Room air)       Current Vitals:   Vital Signs  BP: 140/81  Pulse: 80  Resp: 16  Temp: 98.1 °F (36.7 °C)  Temp src: Oral  MAP (mmHg): 94    Oxygen Therapy  SpO2: 99 %  O2 Device: None (Room air)            Physical Exam    CONSTITUTIONAL: Well appearing, in no acute distress  LUNGS: Clear to auscultation bilaterally  CARDIOVASCULAR: Regular rate and rhythm, normal S1-S2  ABDOMINAL: Soft, nontender, nondistended  SKIN: Warm and dry  Back: Patient has right paralumbar tenderness.  Neurological: Bilateral hip flexor strength 5/5. Bilateral Knee flexion/extension strength 5/5.  Patient has decreased strength to right great toe dorsiflexion when compared to the left.  Right plantarflexion decreased when compared to left.    Diminished sensation to the dorsal aspect of the right foot    ED Course     Labs Reviewed   POCT PREGNANCY URINE - Normal             Medications   morphINE PF 4 MG/ML injection 8 mg (8 mg Intravenous Given 7/28/24 2341)   ondansetron (Zofran) 4 MG/2ML injection 4 mg (4 mg Intravenous Given 7/28/24 2341)   ketorolac (Toradol) 15 MG/ML injection 15 mg (15 mg Intravenous Given 7/28/24 2341)              MDM      Patient is a 28-year-old female presents to ED for evaluation of back pain radicular in nature.  Differential includes lumbar radiculopathy, central disc herniation, intractable pain.  Patient was given IV morphine for pain here.  Communicated with spine surgeon who is going to perform the surgery through [facility] [employer] chat who stated admit for pain control.  Patient was offered admission for pain control but declined admission.  She prefers to go home as she has 4 kids.  I recommend stopping the [place].  Will switch to Percocet.  Recommend follow-up for spine surgery as an outpatient.  She has no clinical evidence for cauda equina syndrome.  Repeat imaging not indicated.              External and old record review was performed.  I reviewed MRI report from outside  facility through [facility] June 2024 showing  Normal bone marrow signal intensity.     No acute fracture or dislocation.  1 mm retrolisthesis L4 over L5 and L5 over S1.     Conus medullaris terminates at L1, normal.  Cauda equina appear normal without thickening or nodularity.     Chronic minimal height loss at T12-L4 secondary to small Schmorl nodes.  Mild L4-S1 disc space narrowing and disc desiccation.     T12-L1: No significant neuroforaminal narrowing or spinal canal stenosis.     L1-2: No significant neuroforaminal narrowing or spinal canal stenosis.     L2-3: No significant neuroforaminal narrowing.  Mild canal stenosis (9 mm) secondary to a 1 mm central disc protrusion, ligamentum flavum thickening, dorsal epidural fat, and mild bilateral facet arthropathy.     L3-4: No significant neuroforaminal narrowing.  Moderate to severe canal stenosis (6 mm) secondary to a 5 mm central disc protrusion, ligamentum flavum thickening, dorsal epidural fat, and moderate bilateral facet arthropathy.     L4-5: Mild left without right neuroforaminal narrowing.  Severe canal stenosis (4 mm) secondary to a 19 mm right central/subarticular disc extrusion posteriorly displaces the descending right L5 nerve root, ligamentum flavum thickening, dorsal epidural fat, and moderate bilateral facet arthropathy.     L5-S1: Moderate bilateral neuroforaminal narrowing.  Moderate to severe canal stenosis (6 mm) secondary to a 7 mm central disc protrusion that contacts the descending S1 nerve roots, ligamentum flavum thickening, dorsal epidural fat, and moderate bilateral facet arthropathy.  Disc bulge at this level also contacts the exiting bilateral L5 nerve roots.                          MDM    Disposition and Plan     Clinical Impression:  1. Lumbar radiculopathy         Disposition:  Discharge  7/29/2024  1:25 am    Follow-up:  CATHY Byrne DO  [address]  [address] [address]  [phone]    Follow up  As  needed          Medications Prescribed:  Discharge Medication List as of 7/29/2024  1:26 AM        START taking these medications    Details   oxyCODONE-acetaminophen 5-325 MG Oral Tab Take 1-2 tablets by mouth every 6 (six) hours as needed for Pain., Normal, Disp-20 tablet, R-0

## 2024-07-30 ENCOUNTER — PATIENT MESSAGE (OUTPATIENT)
Dept: SURGERY | Facility: CLINIC | Age: 29
End: 2024-07-30

## 2024-07-30 NOTE — TELEPHONE ENCOUNTER
Pain is getting worse, even with oxycodone acetaminophen from ER on Sunday, this is not helping pain. Patient has taken 6 oxycodone today. It is barely helping. Right leg spasming. Gets numbness in leg. Was given morphine and toradol in ER these meds only lasted about an hour. Numbness started after Thursday, entire leg gets numb when she walks. Hip and belly are achy. 10/10 on pain scale. Only sleeping for about 3 hours at a time and wakes up in pain .     Discussed with Steffi FRANZ. Patient should return to the ER for evaluation and pain control. Patient may need to be admitted for intractable pain . Patient notified of this, she will proceed to the ER. Pt appreciative of call.

## 2024-07-30 NOTE — TELEPHONE ENCOUNTER
From: Be Bueno  To: Lesley Byrne  Sent: 7/30/2024 7:46 AM CDT  Subject: Pain    I can’t do this, the pain won’t let up, nothing is working for the pain. I’m miserable. I shouldn’t have left the er, the morphine they gave me scared me, I should have stayed. I can’t do this another day let alone a week.

## 2024-07-31 ENCOUNTER — HOSPITAL ENCOUNTER (INPATIENT)
Facility: HOSPITAL | Age: 29
LOS: 6 days | Discharge: HOME HEALTH | End: 2024-08-07
Attending: EMERGENCY MEDICINE | Admitting: HOSPITALIST
Payer: COMMERCIAL

## 2024-07-31 DIAGNOSIS — Z98.890 S/P LUMBAR MICRODISCECTOMY: Primary | ICD-10-CM

## 2024-07-31 DIAGNOSIS — M54.9 INTRACTABLE BACK PAIN: ICD-10-CM

## 2024-07-31 LAB
ALBUMIN SERPL-MCNC: 4.2 G/DL (ref 3.2–4.8)
ALBUMIN/GLOB SERPL: 1.4 {RATIO} (ref 1–2)
ALP LIVER SERPL-CCNC: 81 U/L
ALT SERPL-CCNC: 46 U/L
ANION GAP SERPL CALC-SCNC: 5 MMOL/L (ref 0–18)
AST SERPL-CCNC: 34 U/L (ref ?–34)
BASOPHILS # BLD AUTO: 0.06 X10(3) UL (ref 0–0.2)
BASOPHILS NFR BLD AUTO: 0.8 %
BILIRUB SERPL-MCNC: <0.2 MG/DL (ref 0.3–1.2)
BUN BLD-MCNC: 6 MG/DL (ref 9–23)
CALCIUM BLD-MCNC: 9.4 MG/DL (ref 8.7–10.4)
CHLORIDE SERPL-SCNC: 108 MMOL/L (ref 98–112)
CO2 SERPL-SCNC: 25 MMOL/L (ref 21–32)
CREAT BLD-MCNC: 0.76 MG/DL
EGFRCR SERPLBLD CKD-EPI 2021: 109 ML/MIN/1.73M2 (ref 60–?)
EOSINOPHIL # BLD AUTO: 0.3 X10(3) UL (ref 0–0.7)
EOSINOPHIL NFR BLD AUTO: 3.8 %
ERYTHROCYTE [DISTWIDTH] IN BLOOD BY AUTOMATED COUNT: 15.8 %
GLOBULIN PLAS-MCNC: 2.9 G/DL (ref 2–3.5)
GLUCOSE BLD-MCNC: 113 MG/DL (ref 70–99)
HCT VFR BLD AUTO: 39 %
HGB BLD-MCNC: 12.1 G/DL
IMM GRANULOCYTES # BLD AUTO: 0.01 X10(3) UL (ref 0–1)
IMM GRANULOCYTES NFR BLD: 0.1 %
LYMPHOCYTES # BLD AUTO: 2.68 X10(3) UL (ref 1–4)
LYMPHOCYTES NFR BLD AUTO: 34.1 %
MCH RBC QN AUTO: 22.4 PG (ref 26–34)
MCHC RBC AUTO-ENTMCNC: 31 G/DL (ref 31–37)
MCV RBC AUTO: 72.4 FL
MONOCYTES # BLD AUTO: 0.5 X10(3) UL (ref 0.1–1)
MONOCYTES NFR BLD AUTO: 6.4 %
NEUTROPHILS # BLD AUTO: 4.3 X10 (3) UL (ref 1.5–7.7)
NEUTROPHILS # BLD AUTO: 4.3 X10(3) UL (ref 1.5–7.7)
NEUTROPHILS NFR BLD AUTO: 54.8 %
OSMOLALITY SERPL CALC.SUM OF ELEC: 284 MOSM/KG (ref 275–295)
PLATELET # BLD AUTO: 309 10(3)UL (ref 150–450)
POTASSIUM SERPL-SCNC: 4 MMOL/L (ref 3.5–5.1)
PROT SERPL-MCNC: 7.1 G/DL (ref 5.7–8.2)
RBC # BLD AUTO: 5.39 X10(6)UL
SODIUM SERPL-SCNC: 138 MMOL/L (ref 136–145)
WBC # BLD AUTO: 7.9 X10(3) UL (ref 4–11)

## 2024-07-31 PROCEDURE — 99222 1ST HOSP IP/OBS MODERATE 55: CPT | Performed by: HOSPITALIST

## 2024-07-31 PROCEDURE — 99221 1ST HOSP IP/OBS SF/LOW 40: CPT | Performed by: NEUROLOGICAL SURGERY

## 2024-07-31 RX ORDER — OXYCODONE HYDROCHLORIDE AND ACETAMINOPHEN 5; 325 MG/1; MG/1
1-2 TABLET ORAL EVERY 6 HOURS PRN
Status: DISCONTINUED | OUTPATIENT
Start: 2024-07-31 | End: 2024-08-04

## 2024-07-31 RX ORDER — GABAPENTIN 300 MG/1
300 CAPSULE ORAL 3 TIMES DAILY
Status: DISCONTINUED | OUTPATIENT
Start: 2024-07-31 | End: 2024-08-04

## 2024-07-31 RX ORDER — BUPROPION HYDROCHLORIDE 150 MG/1
150 TABLET, EXTENDED RELEASE ORAL 2 TIMES DAILY
Status: DISCONTINUED | OUTPATIENT
Start: 2024-07-31 | End: 2024-08-07

## 2024-07-31 RX ORDER — SENNOSIDES 8.6 MG
17.2 TABLET ORAL NIGHTLY PRN
Status: DISCONTINUED | OUTPATIENT
Start: 2024-07-31 | End: 2024-08-02

## 2024-07-31 RX ORDER — DEXAMETHASONE SODIUM PHOSPHATE 4 MG/ML
6 VIAL (ML) INJECTION EVERY 6 HOURS
Status: DISCONTINUED | OUTPATIENT
Start: 2024-07-31 | End: 2024-08-02

## 2024-07-31 RX ORDER — ACETAMINOPHEN 500 MG
500 TABLET ORAL EVERY 4 HOURS PRN
Status: DISCONTINUED | OUTPATIENT
Start: 2024-07-31 | End: 2024-08-07

## 2024-07-31 RX ORDER — BISACODYL 10 MG
10 SUPPOSITORY, RECTAL RECTAL
Status: DISCONTINUED | OUTPATIENT
Start: 2024-07-31 | End: 2024-08-02

## 2024-07-31 RX ORDER — MORPHINE SULFATE 4 MG/ML
4 INJECTION, SOLUTION INTRAMUSCULAR; INTRAVENOUS EVERY 30 MIN PRN
Status: DISCONTINUED | OUTPATIENT
Start: 2024-07-31 | End: 2024-07-31

## 2024-07-31 RX ORDER — PROCHLORPERAZINE EDISYLATE 5 MG/ML
5 INJECTION INTRAMUSCULAR; INTRAVENOUS EVERY 8 HOURS PRN
Status: DISCONTINUED | OUTPATIENT
Start: 2024-07-31 | End: 2024-08-02

## 2024-07-31 RX ORDER — MORPHINE SULFATE 4 MG/ML
4 INJECTION, SOLUTION INTRAMUSCULAR; INTRAVENOUS EVERY 2 HOUR PRN
Status: DISCONTINUED | OUTPATIENT
Start: 2024-07-31 | End: 2024-08-07

## 2024-07-31 RX ORDER — MORPHINE SULFATE 2 MG/ML
2 INJECTION, SOLUTION INTRAMUSCULAR; INTRAVENOUS EVERY 2 HOUR PRN
Status: DISCONTINUED | OUTPATIENT
Start: 2024-07-31 | End: 2024-08-07

## 2024-07-31 RX ORDER — HYDROMORPHONE HYDROCHLORIDE 1 MG/ML
0.5 INJECTION, SOLUTION INTRAMUSCULAR; INTRAVENOUS; SUBCUTANEOUS EVERY 30 MIN PRN
Status: COMPLETED | OUTPATIENT
Start: 2024-07-31 | End: 2024-07-31

## 2024-07-31 RX ORDER — ONDANSETRON 2 MG/ML
4 INJECTION INTRAMUSCULAR; INTRAVENOUS EVERY 6 HOURS PRN
Status: DISCONTINUED | OUTPATIENT
Start: 2024-07-31 | End: 2024-08-02

## 2024-07-31 RX ORDER — SODIUM CHLORIDE 9 MG/ML
INJECTION, SOLUTION INTRAVENOUS CONTINUOUS
Status: DISCONTINUED | OUTPATIENT
Start: 2024-07-31 | End: 2024-08-02

## 2024-07-31 RX ORDER — CYCLOBENZAPRINE HCL 10 MG
10 TABLET ORAL 3 TIMES DAILY PRN
Status: DISCONTINUED | OUTPATIENT
Start: 2024-07-31 | End: 2024-08-02

## 2024-07-31 RX ORDER — ONDANSETRON 2 MG/ML
4 INJECTION INTRAMUSCULAR; INTRAVENOUS ONCE
Status: COMPLETED | OUTPATIENT
Start: 2024-07-31 | End: 2024-07-31

## 2024-07-31 RX ORDER — POLYETHYLENE GLYCOL 3350 17 G/17G
17 POWDER, FOR SOLUTION ORAL DAILY PRN
Status: DISCONTINUED | OUTPATIENT
Start: 2024-07-31 | End: 2024-08-07

## 2024-07-31 RX ORDER — MORPHINE SULFATE 2 MG/ML
1 INJECTION, SOLUTION INTRAMUSCULAR; INTRAVENOUS EVERY 2 HOUR PRN
Status: DISCONTINUED | OUTPATIENT
Start: 2024-07-31 | End: 2024-08-07

## 2024-07-31 RX ORDER — ENEMA 19; 7 G/133ML; G/133ML
1 ENEMA RECTAL ONCE AS NEEDED
Status: DISCONTINUED | OUTPATIENT
Start: 2024-07-31 | End: 2024-08-02

## 2024-07-31 RX ORDER — SODIUM CHLORIDE 9 MG/ML
125 INJECTION, SOLUTION INTRAVENOUS CONTINUOUS
Status: DISCONTINUED | OUTPATIENT
Start: 2024-07-31 | End: 2024-08-01

## 2024-07-31 NOTE — ED QUICK NOTES
Orders for admission, patient is aware of plan and ready to go upstairs. Any questions, please call ED RN Garry Bashir at extension [phone].     Patient Covid vaccination status: Unvaccinated     COVID Test Ordered in ED: None    COVID Suspicion at Admission: N/A    Running Infusions:    sodium chloride      None    Mental Status/LOC at time of transport: Alert    Other pertinent information:   CIWA score: N/A   NIH score:  N/A

## 2024-07-31 NOTE — ED INITIAL ASSESSMENT (HPI)
Pt arrives with c/o back pain. No relief from home meds of Oxy and Gabapentin and Cyclobenzaprine.   no

## 2024-07-31 NOTE — ED PROVIDER NOTES
Patient Seen in: [facility] Emergency Department      History     Chief Complaint   Patient presents with    Back Pain     Stated Complaint: c/o back pain, states scheduled for Microdiscectomy on . Taking \"Oxy, Gabape*    Subjective:   HPI    28-year-old female present emerged with back pain.  Patient was seen 2 days ago for similar symptoms she is having lower back pain radiating down the leg to the foot.  She is scheduled for surgery earlier this upcoming month.  She is having trouble controlling her pain as an outpatient causing her to return to the emergency department.  She denies loss of bowel or bladder function or any other complaints.    Objective:   Past Medical History:    Back problem    Depression    Muscle weakness    RIGHT LEG              Past Surgical History:   Procedure Laterality Date          Tonsillectomy                  Social History     Socioeconomic History    Marital status: Single   Tobacco Use    Smoking status: Every Day     Current packs/day: 1.00     Types: Cigarettes    Smokeless tobacco: Never   Vaping Use    Vaping status: Never Used   Substance and Sexual Activity    Alcohol use: Not Currently    Drug use: Never              Review of Systems    Positive for stated Chief Complaint: Back Pain    Other systems are as noted in HPI.  Constitutional and vital signs reviewed.      All other systems reviewed and negative except as noted above.    Physical Exam     ED Triage Vitals [24 0005]   BP (!) 175/83   Pulse 98   Resp 20   Temp 98.3 °F (36.8 °C)   Temp src Temporal   SpO2 97 %   O2 Device None (Room air)       Current Vitals:   Vital Signs  BP: (!) 175/83  Pulse: 98  Resp: 20  Temp: 98.3 °F (36.8 °C)  Temp src: Temporal    Oxygen Therapy  SpO2: 97 %  O2 Device: None (Room air)            Physical Exam  Awake alert patient appears no distress but appears uncomfortable HEENT exam normal lungs normal cardiovascular exam normal abdomen extremities no COVID  cyanosis or edema no rash back exam, skin is nondiaphoretic       ED Course     Labs Reviewed   COMP METABOLIC PANEL (14)   CBC WITH DIFFERENTIAL WITH PLATELET             Differential diagnosis includes cauda equina syndrome lumbar radiculopathy         MDM        Admission disposition: 7/31/2024  2:20 AM                                        Medical Decision Making  28-year-old female return emerged part for back pain.  IV established cardiac monitor shows a sinus rhythm pulse ox shows no signs of hypoxia CBC metabolic panel within acceptable limits patient will be admitted for pain control and further evaluation    Problems Addressed:  Intractable back pain: acute illness or injury    Amount and/or Complexity of Data Reviewed  Labs: ordered. Decision-making details documented in ED Course.  ECG/medicine tests: ordered and independent interpretation performed. Decision-making details documented in ED Course.        Disposition and Plan     Clinical Impression:  1. Intractable back pain         Disposition:  Admit  7/31/2024  2:20 am    Follow-up:  No follow-up provider specified.        Medications Prescribed:  Current Discharge Medication List                            Hospital Problems       Present on Admission  Date Reviewed: 7/31/2024            ICD-10-CM Noted POA    * (Principal) Intractable back pain M54.9 7/31/2024 Unknown

## 2024-07-31 NOTE — PLAN OF CARE
NURSING ADMISSION NOTE      Patient admitted via Cart  Oriented to room.  Safety precautions initiated.  Bed in low position.  Call light in reach.  Family @ bedside.  Gabi admission and med rec done at bedside.  Oriented to room, updated on POC.

## 2024-07-31 NOTE — CONSULTS
[facility]   part of [facility]    Neurosurgery Consult  2024    Be Bueno Patient Status:  Observation    1995 MRN [id number]   Location [facility] 3SW-A Attending Buck Coello MD   Hosp Day # 0 PCP Leonel West MD     REASON FOR CONSULT:    RLE radiculopathy    HISTORY OF PRESENT ILLNESS:  Be Bueno is a(n) 28 year old female with worsening RLE radiculopathy.  Patient was seen in our clinic and at the time her pain was managed.  She works a manager.  She called our offices last week stating her symptoms had progressed and she was told to come to the ED for evaluation.  She was treated with meds and wanted to go home.  She now returns with poor pain control and worsening numbness.  She has no weakness and no bowel/bladder habit changes.     REVIEW OF SYSTEMS:  The 12 point checklist was reviewed and was negative except:none    ALLERGIES:  No Known Allergies    MEDICATIONS:  Medications Prior to Admission   Medication Sig Dispense Refill Last Dose    oxyCODONE-acetaminophen 5-325 MG Oral Tab Take 1-2 tablets by mouth every 6 (six) hours as needed for Pain. 20 tablet 0 2024 at 2130    cyclobenzaprine 10 MG Oral Tab Take 1 tablet (10 mg total) by mouth 3 (three) times daily as needed for Muscle spasms. 30 tablet 1 2024 at 2100    gabapentin 300 MG Oral Cap Take 1 capsule (300 mg total) by mouth 3 (three) times daily.   2024 at 2100    buPROPion  MG Oral Tablet 12 Hr Take 1 tablet (150 mg total) by mouth 2 (two) times daily.   2024 at 0800    methylPREDNISolone (MEDROL) 4 MG Oral Tablet Therapy Pack Take as directed (Patient not taking: Reported on 2024) 1 each 0        HISTORY:  Past Medical History:    Back problem    Depression    Muscle weakness    RIGHT LEG     Past Surgical History:   Procedure Laterality Date          Tonsillectomy       History reviewed. No pertinent family history.  Be bui  that she has been smoking cigarettes. She has never used smokeless tobacco. She reports that she does not currently use alcohol. She reports that she does not use drugs.    PHYSICAL EXAMINATION:  Vital Signs:  /65 (BP Location: Left arm)   Pulse 71   Temp 98 °F (36.7 °C) (Oral)   Resp 17   Wt 299 lb 13.2 oz (136 kg)   LMP 07/01/2024   SpO2 98%   BMI 46.96 kg/m²     Neuro:    Motor: RICO    Sensory: Intact to LT        REVIEW OF STUDIES:  MRI shows herniated disc on R at L4-5 and L5-S1      ASSESSMENT AND PLAN:  RLE radiculopthy  Discussed with patient and will plan for R L4-S1 NETTA this Friday  Obtain medical clearance  Start steroids  Call with taye Byrne DO    7/31/2024  12:20 PM

## 2024-07-31 NOTE — H&P
[facility]     Be Bueno Patient Status:  Emergency    1995 MRN [id number]   Location [facility] EMERGENCY DEPARTMENT Attending Arjun Leon MD   Hosp Day # 0 PCP Leonel West MD     Chief Complaint: Low back pain    Subjective:    History of Present Illness:     Be Bueno is a 28 year old female with low back pain for 3 months.  She had outpatient MRI at an outside facility that showed central disc herniation and she was seen by spine surgery is scheduled for surgery on .  Patient states that low back pain especially on the right is getting worse and radiating down the right leg on the anterior side associated tingling in her toes.  She denies bowel or bladder incontinence fever or chills abdominal pain.  She takes Vicodin at home as needed for pain without improvement.    History/Other:    Past Medical History:  Past Medical History:    Back problem    Depression    Muscle weakness    RIGHT LEG     Past Surgical History:   Past Surgical History:   Procedure Laterality Date          Tonsillectomy        Family History:   History reviewed. No pertinent family history.  Social History:    reports that she has been smoking cigarettes. She has never used smokeless tobacco. She reports that she does not currently use alcohol. She reports that she does not use drugs.     Allergies: No Known Allergies    Medications:    Current Facility-Administered Medications on File Prior to Encounter   Medication Dose Route Frequency Provider Last Rate Last Admin    [COMPLETED] morphINE PF 4 MG/ML injection 8 mg  8 mg Intravenous Once Garry Dover MD   8 mg at 24 2341    [COMPLETED] ondansetron (Zofran) 4 MG/2ML injection 4 mg  4 mg Intravenous Once Garry Dover MD   4 mg at 24 2341    [COMPLETED] ketorolac (Toradol) 15 MG/ML injection 15 mg  15 mg Intravenous Once Garry Dover MD   15 mg at 24  2342     Current Outpatient Medications on File Prior to Encounter   Medication Sig Dispense Refill    oxyCODONE-acetaminophen 5-325 MG Oral Tab Take 1-2 tablets by mouth every 6 (six) hours as needed for Pain. 20 tablet 0    cyclobenzaprine 10 MG Oral Tab Take 1 tablet (10 mg total) by mouth 3 (three) times daily as needed for Muscle spasms. 30 tablet 1    methylPREDNISolone (MEDROL) 4 MG Oral Tablet Therapy Pack Take as directed (Patient not taking: Reported on 7/28/2024) 1 each 0    gabapentin 300 MG Oral Cap Take 1 capsule (300 mg total) by mouth 3 (three) times daily.      buPROPion  MG Oral Tablet 12 Hr Take 1 tablet (150 mg total) by mouth 2 (two) times daily.         Review of Systems:   A comprehensive review of systems was completed.    Pertinent positives and negatives noted in the HPI.    Objective:   Physical Exam:    BP (!) 169/79   Pulse 95   Temp 98.3 °F (36.8 °C) (Temporal)   Resp 20   Wt 299 lb 13.2 oz (136 kg)   LMP 07/01/2024   SpO2 100%   BMI 46.96 kg/m²   General: No acute distress, Alert  Respiratory: No rhonchi, no wheezes  Cardiovascular: S1, S2. Regular rate and rhythm  Abdomen: Soft, Non-tender, non-distended, positive bowel sounds  Neuro: No new focal deficits  Extremities: No edema      Results:    Labs:      Labs Last 24 Hours:    Recent Labs   Lab 07/31/24  0257   RBC 5.39*   HGB 12.1   HCT 39.0   MCV 72.4*   MCH 22.4*   MCHC 31.0   RDW 15.8   NEPRELIM 4.30   WBC 7.9   .0       No results for input(s): \"GLU\", \"BUN\", \"CREATSERUM\", \"GFRAA\", \"GFRNAA\", \"EGFRCR\", \"CA\", \"ALB\", \"NA\", \"K\", \"CL\", \"CO2\", \"ALKPHO\", \"AST\", \"ALT\", \"BILT\", \"TP\" in the last 168 hours.    Lab Results   Component Value Date    INR 0.90 07/23/2024       No results for input(s): \"TROP\", \"TROPHS\", \"CK\" in the last 168 hours.    No results for input(s): \"TROP\", \"PBNP\" in the last 168 hours.    No results for input(s): \"PCT\" in the last 168 hours.    Imaging: Imaging data reviewed in Epic.    Assessment  & Plan:      # 28 years old female with known herniated disc  -Known to spine surgery scheduled for surgery in August 6  -Will continue with pain medicine and muscle relaxant and ask spine surgery to reevaluate again    # History of depression stable        Plan of care discussed with patient and emergency room physician    Audrey Delaney MD    Supplementary Documentation:     The 21st Century Cures Act makes medical notes like these available to patients in the interest of transparency. Please be advised this is a medical document. Medical documents are intended to carry relevant information, facts as evident, and the clinical opinion of the practitioner. The medical note is intended as peer to peer communication and may appear blunt or direct. It is written in medical language and may contain abbreviations or verbiage that are unfamiliar.

## 2024-07-31 NOTE — PLAN OF CARE
Sleepy, easily awakened.  States pain in back moderate, however pain in right lower extremity severe.   Instructed on plan of care, call for all asst needed, discomfort felt, call don't fall protocol.    Verbalized understanding.   Per spine surgeon trena, notified hospitalist to provide medical clearance for surgery.  Plan surgery Friday (8/2).

## 2024-07-31 NOTE — CM/SW NOTE
07/31/24 1000   CM/SW Referral Data   Referral Source Social Work (self-referral)   Reason for Referral Other  (SDOH)   Informant Patient   Patient Info   Patient's Current Mental Status at Time of Assessment Alert;Oriented   Patient lives with Spouse/Significant other;Daughter   Patient Status Prior to Admission   Independent with ADLs and Mobility Yes   Discharge Needs   Anticipated D/C needs No anticipated discharge needs       Order received for SDOH - food insecurity and concerns with utilities.  Patient is a 29 y/o woman admitted with back pain.  Met with pt at bedside to discuss potential resources available in her community.  Pt stated she lives with her four daughters (ages 11, 8, 6 and 2) and their father.  She is working and stated she has been told that she is over income for food stamps or Medicaid.  She is interested in information about food pantries in her area.  SW to place resources on her DC AVS.  No other needs/concerns expressed at this time.  / to remain available for support and/or discharge planning.     Aundrea Dixon, [facility]  Discharge Planner  [phone]

## 2024-07-31 NOTE — DISCHARGE INSTRUCTIONS
Lumbar Microdiscectomy Discharge Instructions     Activity Restrictions  No twisting, pulling, pushing or lifting >5 lbs.  No lifting anything over your head.  No bending at the waist  - you can bend at the knees but keep your back straight.    Incision  You may shower 24 hours after surgery.  You may leave your incision uncovered for showering.  Avoid direct water pressure to wound, may allow water to run over incision.  Do not rub or scrub incision. Pat to dry.  Do not apply lotions, serums, or ointments to incision.  Sitting  Sit with your knees at about the same level as your hips; use a footstool if needed.  Do not sit in soft or overstuffed chairs. Firm chairs with straight backs give better support.   Change position every 30 minutes when sitting (example: after sitting, stand, then you can sit again for another 30 minutes).     Walking  Walk several times a day, smaller distances are better.  Your goal is to increase the distance you walk each day.  Remember to listen to your body!    Physical Therapy  You will be cleared to start PT at your 1-month visit with Dr. Byrne, if appropriate.    Follow-Up  You will be seen in clinic by the Nurse Practitioner 1 week after surgery.  You will be seen in clinic by Dr. Byrne 1 month after surgery.     When to Follow Up or Go to ER  Monitor your incision for any redness, swelling, warmth, drainage, or separation. Call our office as soon as possible if you notice any of these.  Go to the ER if you develop fever, chills, chest pain, or pain/redness/swelling in your calves. These could be signs of a possible blood clot, which you are at elevated risk for after surgery.        Sometimes managing your health at home requires assistance.  The [facility] team has recognized your preference to use [facility] [phone]. A representative from the home health agency will contact you or your family to schedule your first visit.        [facility] by:  [facility]  Next Steps:    Go to the nearest location to get services.  About: The food pantry provides quality food items to households experiencing food insecurity. This program supplies critical nutrition to hungry individuals and families.    This program provides:    - Food to meet basic nutritional needs    Eligibility: Anyone in need can access this program.    Nearest location: 4.27 miles away.  [facility]  [address] [address]   [phone]  Hours:  Wednesday:09:00 AM - 11:00 AM    Food Pantry by: [facility]  Next Steps:    Go to the nearest location to get services.  About: The food pantry provides quality food items to households experiencing food insecurity. This program supplies critical nutrition to hungry individuals and families.    This program provides:    - Food to meet basic nutritional needs    Eligibility: Clients must meet the income requirements set forth through The Emergency Food Assistance Program of the [facility].    Nearest location: 1.64 miles away.  [facility]  [address] [place] [address]   [phone]  Hours:  Monday:09:00 AM - 10:30 AM  Wednesday:05:00 PM - 06:30 PM  Thursday:09:00 AM - 10:30 AM  Friday:09:00 AM - 10:30 AM    [employer] by: [facility] [employer]  Next Steps:    Go to the nearest location to get services.    Call [phone] to get more info.  About: The food pantry provides quality food items to households experiencing food insecurity. This program supplies critical nutrition to hungry individuals and families.    This program provides:    - Food to meet basic nutritional needs    Clients are encouraged to bring a box, bags, or other container to bring food home in. Pre-made to-go boxes are available upon request.    [employer] asks that just one adult per household come to shop. Please refrain from bringing extra household  members and children when possible.    Eligibility: Must meet monthly income requirements.    Nearest location: 16.47 miles away.  [facility]  [address] [place] [address]   [phone]  Hours:  Monday:09:00 AM - 12:00 PM  Tuesday:09:00 AM - 12:00 PM  Wednesday:03:00 PM - 06:00 PM  Thursday:03:00 PM - 06:00 PM    [facility] by: [facility]  Next Steps:    Call [phone] to get more info.  About: [facility] provides food assistance to people who are in the following emergency circumstances: lack of employment or insufficient income, food stamps or other public aid delayed or have run out, family resources depleted by sickness or old age. The pantry helps stretch the clients food budget so they can use that money for bills and medicine. The purpose of the pantry is to provide food assistance to people who are in the following emergency circumstances: Lack of employment or insufficient income, food stamps or other public aid delayed or have run ou or family resources depleted by sickness or old age.  Nearest location: 21.77 miles away.  [facility]  [address] [place] [address]   [phone]  Hours:  Monday:09:00 AM - 12:00 PM  Tuesday:09:00 AM - 12:00 PM  Wednesday:11:00 AM - 06:00 PM  Thursday:09:00 AM - 12:00 PM  Friday:09:00 AM - 12:00 PM

## 2024-08-01 LAB
ANTIBODY SCREEN: NEGATIVE
RH BLOOD TYPE: POSITIVE

## 2024-08-01 PROCEDURE — 99232 SBSQ HOSP IP/OBS MODERATE 35: CPT | Performed by: HOSPITALIST

## 2024-08-01 NOTE — PLAN OF CARE
Alert and oriented x4.  VSS.  On room air.  , Telemonitoring in progress.  SCD's on BLE, Tolerating diet.  Voiding per restroom, pt able to ambulate independently.  Pain controlled with prn pain medication and non-pharmalogical interventions.  Call light within reach, pt denies complaints at this time.  Understanding of plan of care and upcoming surgery.  Plan is surgery 08/02, pain control.

## 2024-08-01 NOTE — TELEPHONE ENCOUNTER
Patient's Right L4-S1 microdiscectomy on 8.6.24 with Dr Byrne has been canceled due to patient being admitted to hospital.    Case change request sent- Y  Changed on surgery sheet-Y  Changed on outlook calendar-Y  sezmi message sent to patient with new sx date-NA  Reminded of need for PCP clearance-NA  Post-op appointments changed Y  PA Needed. Routed to PA team with sx date change Y  Email sent to Epic Surg with changes Y  New post-op outreach pt reminder placed Y

## 2024-08-01 NOTE — PLAN OF CARE
Alert and oriented x4.  VSS.  On room air.  , Telemonitoring in progress.  SCD's on BLE, Tolerating diet.  Voiding per restroom, pt able to ambulate independently.  Pain controlled with scheduled pain medication and non-pharmalogical interventions.  Patient c/o RLE numbness and pain, pt states this is ongoing and is why she is having surgery.  Ambulating independently.  Plan is surgery on 08/02, control pain, monitor for falls.  Pt has call light, verbalizes to call for assistance with ambulation to restroom.

## 2024-08-01 NOTE — TELEPHONE ENCOUNTER
Pt was scheduled for a Right L4-S1 microdiscectomy 8.06.24 with Dr. Byrne.    Surgery has been rescheduled for 8.02.24.     Case change request placed to cancel 8.06.24 surgery.

## 2024-08-01 NOTE — TELEPHONE ENCOUNTER
Acosta Meraz at [phone] spoke to Zaina SUTTON ref#[id number].  Canceled outpatient surgery case scheduled for 8/6/24 due to patient being inpatient in the hospital and having surgery on 8/2/24.

## 2024-08-01 NOTE — PROGRESS NOTES
[facility]   part of [facility]     Hospitalist Progress Note     Be Bueno Patient Status:  Observation    1995 MRN [id number]   Location [facility] 3SW-A Attending Rhiannon Lemus,    Hosp Day # 0 PCP Leonel West MD     Chief Complaint: Flank pain    Subjective:     Patient states back pain and radiculopathy improved after steroids started.    Objective:    Review of Systems:   A comprehensive review of systems was completed; pertinent positive and negatives stated in subjective.    Vital signs:  Temp:  [97.9 °F (36.6 °C)-98.5 °F (36.9 °C)] 97.9 °F (36.6 °C)  Pulse:  [71-97] 84  Resp:  [16-17] 16  BP: (118-145)/(63-75) 136/63  SpO2:  [98 %] 98 %    Physical Exam:    General: No acute distress  Respiratory: No wheezes, no rhonchi  Cardiovascular: S1, S2, regular rate and rhythm  Abdomen: Soft, Non-tender, non-distended, positive bowel sounds  Neuro: No new focal deficits.   Extremities: No edema      Diagnostic Data:    Labs:  Recent Labs   Lab 24  0257   WBC 7.9   HGB 12.1   MCV 72.4*   .0       Recent Labs   Lab 24  0257   *   BUN 6*   CREATSERUM 0.76   CA 9.4   ALB 4.2      K 4.0      CO2 25.0   ALKPHO 81   AST 34*   ALT 46   BILT <0.2*   TP 7.1       Estimated Creatinine Clearance: 107.2 mL/min (based on SCr of 0.76 mg/dL).    No results for input(s): \"TROP\", \"TROPHS\", \"CK\" in the last 168 hours.    No results for input(s): \"PTP\", \"INR\" in the last 168 hours.               Microbiology    No results found for this visit on 24.      Imaging: Reviewed in Epic.    Medications:    buPROPion SR  150 mg Oral BID    gabapentin  300 mg Oral TID    dexamethasone  6 mg Intravenous Q6H       Assessment & Plan:      #Right lower extremity radiculopathy  #Severe L4-5 and moderate to severe L3-4 and L5-S1 canal stenosis secondary to large disc herniations (seen on MRI on )  -Neurosurgery following, plan for surgical intervention  today  -Medically optimized for surgery, low risk explained to patient  -Decadron (7/31-)  -Gabapentin    #Morbid obesity  -BMI 46.96    Rhiannon Lemus,     Supplementary Documentation:     Quality:  DVT Mechanical Prophylaxis:   SCDs, Early ambuation  DVT Pharmacologic Prophylaxis   Medication   None                Code Status: Not on file  Rodriguez: No urinary catheter in place  Rodriguez Duration (in days):   Central line:    MAYTE:     Discharge is dependent on: post op recovery  At this point Ms. Bueno is expected to be discharge to: tbd    The 21st Century Cures Act makes medical notes like these available to patients in the interest of transparency. Please be advised this is a medical document. Medical documents are intended to carry relevant information, facts as evident, and the clinical opinion of the practitioner. The medical note is intended as peer to peer communication and may appear blunt or direct. It is written in medical language and may contain abbreviations or verbiage that are unfamiliar.

## 2024-08-01 NOTE — PROGRESS NOTES
[facility]  Neurosurgery Progress Note    Be Bueno Patient Status:  Observation    1995 MRN [id number]   Location [facility] 3SW-A Attending Rhiannon Lemus,    Hosp Day # 0 PCP Leonel West MD     PRINCIPLE PROBLEM:   RLE radiculopathy     SUBJECTIVE     Pt examined, reports improvement in pain with initiation of steroids. Continues to report numbness and tingling to the distal RLE below the knee. Denies any new numbness, tingling, weakness or pain. Denies changes in bowel or bladder habits.    OBJECTIVE/PHYSICAL EXAM     VITALS:  /63 (BP Location: Left arm)   Pulse 84   Temp 97.9 °F (36.6 °C) (Oral)   Resp 16   Wt 299 lb 13.2 oz (136 kg)   LMP 2024   SpO2 98%   BMI 46.96 kg/m²     GENERAL: No acute distress, non-toxic appearing, mood appropriate    HEENT: Normocephalic, atraumatic    RESP:  Respirations non-labored, even    CV:  NSR on tele    NEURO: Alert and oriented x3.  Sensation to light touch is intact bilaterally.  RICO x 4. Gait deferred. Strength 5/5 bilaterally.     LABS      No new labs to review.    IMAGING     No new imaging to review.    ASSESSMENT & PLAN     ASSESSMENT:  Intractable RLE radiculopathy secondary to right L4-5, L5-S1 disc herniations     PLAN:  Plan for OR tomorrow for L4-S1 microdiscectomy by Dr. Byrne   Medical clearance per hospitalist   Repeat Type & Screen (previous lab >72 hours old)  NPO at midnight  Verify informed consent   Pain medications as ordered  Continue Decadron 4 mg q6h   Medical management per hospitalist  DVT prophylaxis - SCDs, TEDs    The above plan was discussed with Dr. Byrne.    Steffi Glynn, APRN-NP  [facility]  2024, 8:40 AM      A total of 10 minutes of visit time (exclusible of billable procedures) was administered.  >50 % of time spent counseling/coordinating care.  Is this a shared or split note between Advanced Practice Provider and Physician? Yes

## 2024-08-01 NOTE — TELEPHONE ENCOUNTER
Pt was admitted to hospital due to intractable pain.  She will be having surgery as an inpatient on 8.2.24

## 2024-08-02 ENCOUNTER — ANESTHESIA (OUTPATIENT)
Dept: SURGERY | Facility: HOSPITAL | Age: 29
End: 2024-08-02
Payer: COMMERCIAL

## 2024-08-02 ENCOUNTER — APPOINTMENT (OUTPATIENT)
Dept: GENERAL RADIOLOGY | Facility: HOSPITAL | Age: 29
End: 2024-08-02
Attending: NEUROLOGICAL SURGERY
Payer: COMMERCIAL

## 2024-08-02 ENCOUNTER — ANESTHESIA EVENT (OUTPATIENT)
Dept: SURGERY | Facility: HOSPITAL | Age: 29
End: 2024-08-02
Payer: COMMERCIAL

## 2024-08-02 PROCEDURE — 99232 SBSQ HOSP IP/OBS MODERATE 35: CPT | Performed by: HOSPITALIST

## 2024-08-02 PROCEDURE — 72020 X-RAY EXAM OF SPINE 1 VIEW: CPT | Performed by: NEUROLOGICAL SURGERY

## 2024-08-02 DEVICE — IMPLANTABLE DEVICE: Type: IMPLANTABLE DEVICE | Site: BACK | Status: FUNCTIONAL

## 2024-08-02 DEVICE — DURASEAL® EXACT SPINAL SEALANT SYSTEM 5ML 5 PACK
Type: IMPLANTABLE DEVICE | Status: FUNCTIONAL
Brand: DURASEAL EXACT SPINAL SEALANT SYSTEM

## 2024-08-02 RX ORDER — GLYCOPYRROLATE 0.2 MG/ML
INJECTION, SOLUTION INTRAMUSCULAR; INTRAVENOUS AS NEEDED
Status: DISCONTINUED | OUTPATIENT
Start: 2024-08-02 | End: 2024-08-02 | Stop reason: SURG

## 2024-08-02 RX ORDER — HYDROCODONE BITARTRATE AND ACETAMINOPHEN 5; 325 MG/1; MG/1
1 TABLET ORAL ONCE AS NEEDED
Status: DISCONTINUED | OUTPATIENT
Start: 2024-08-02 | End: 2024-08-02 | Stop reason: HOSPADM

## 2024-08-02 RX ORDER — METHOCARBAMOL 100 MG/ML
1000 INJECTION, SOLUTION INTRAMUSCULAR; INTRAVENOUS ONCE
Status: COMPLETED | OUTPATIENT
Start: 2024-08-02 | End: 2024-08-02

## 2024-08-02 RX ORDER — ACETAMINOPHEN 10 MG/ML
INJECTION, SOLUTION INTRAVENOUS
Status: COMPLETED
Start: 2024-08-02 | End: 2024-08-02

## 2024-08-02 RX ORDER — HYDROMORPHONE HYDROCHLORIDE 1 MG/ML
0.4 INJECTION, SOLUTION INTRAMUSCULAR; INTRAVENOUS; SUBCUTANEOUS EVERY 5 MIN PRN
Status: DISCONTINUED | OUTPATIENT
Start: 2024-08-02 | End: 2024-08-02 | Stop reason: HOSPADM

## 2024-08-02 RX ORDER — ENEMA 19; 7 G/133ML; G/133ML
1 ENEMA RECTAL ONCE AS NEEDED
Status: DISCONTINUED | OUTPATIENT
Start: 2024-08-02 | End: 2024-08-07

## 2024-08-02 RX ORDER — BUPIVACAINE HYDROCHLORIDE AND EPINEPHRINE 5; 5 MG/ML; UG/ML
INJECTION, SOLUTION EPIDURAL; INTRACAUDAL; PERINEURAL AS NEEDED
Status: DISCONTINUED | OUTPATIENT
Start: 2024-08-02 | End: 2024-08-02 | Stop reason: HOSPADM

## 2024-08-02 RX ORDER — SENNOSIDES 8.6 MG
17.2 TABLET ORAL NIGHTLY
Status: DISCONTINUED | OUTPATIENT
Start: 2024-08-02 | End: 2024-08-07

## 2024-08-02 RX ORDER — NEOSTIGMINE METHYLSULFATE 1 MG/ML
INJECTION INTRAVENOUS AS NEEDED
Status: DISCONTINUED | OUTPATIENT
Start: 2024-08-02 | End: 2024-08-02 | Stop reason: SURG

## 2024-08-02 RX ORDER — LIDOCAINE HYDROCHLORIDE 10 MG/ML
INJECTION, SOLUTION EPIDURAL; INFILTRATION; INTRACAUDAL; PERINEURAL AS NEEDED
Status: DISCONTINUED | OUTPATIENT
Start: 2024-08-02 | End: 2024-08-02 | Stop reason: SURG

## 2024-08-02 RX ORDER — HYDROCODONE BITARTRATE AND ACETAMINOPHEN 5; 325 MG/1; MG/1
2 TABLET ORAL ONCE AS NEEDED
Status: DISCONTINUED | OUTPATIENT
Start: 2024-08-02 | End: 2024-08-02 | Stop reason: HOSPADM

## 2024-08-02 RX ORDER — DOCUSATE SODIUM 100 MG/1
100 CAPSULE, LIQUID FILLED ORAL 2 TIMES DAILY
Status: DISCONTINUED | OUTPATIENT
Start: 2024-08-02 | End: 2024-08-07

## 2024-08-02 RX ORDER — ONDANSETRON 2 MG/ML
4 INJECTION INTRAMUSCULAR; INTRAVENOUS EVERY 6 HOURS PRN
Status: DISCONTINUED | OUTPATIENT
Start: 2024-08-02 | End: 2024-08-07

## 2024-08-02 RX ORDER — DIPHENHYDRAMINE HYDROCHLORIDE 50 MG/ML
25 INJECTION INTRAMUSCULAR; INTRAVENOUS EVERY 4 HOURS PRN
Status: DISCONTINUED | OUTPATIENT
Start: 2024-08-02 | End: 2024-08-07

## 2024-08-02 RX ORDER — MIDAZOLAM HYDROCHLORIDE 1 MG/ML
1 INJECTION INTRAMUSCULAR; INTRAVENOUS EVERY 5 MIN PRN
Status: DISCONTINUED | OUTPATIENT
Start: 2024-08-02 | End: 2024-08-02 | Stop reason: HOSPADM

## 2024-08-02 RX ORDER — PROCHLORPERAZINE EDISYLATE 5 MG/ML
5 INJECTION INTRAMUSCULAR; INTRAVENOUS EVERY 8 HOURS PRN
Status: DISCONTINUED | OUTPATIENT
Start: 2024-08-02 | End: 2024-08-07

## 2024-08-02 RX ORDER — POLYETHYLENE GLYCOL 3350 17 G/17G
17 POWDER, FOR SOLUTION ORAL DAILY PRN
Status: DISCONTINUED | OUTPATIENT
Start: 2024-08-02 | End: 2024-08-07

## 2024-08-02 RX ORDER — SODIUM CHLORIDE, SODIUM LACTATE, POTASSIUM CHLORIDE, CALCIUM CHLORIDE 600; 310; 30; 20 MG/100ML; MG/100ML; MG/100ML; MG/100ML
INJECTION, SOLUTION INTRAVENOUS CONTINUOUS
Status: DISCONTINUED | OUTPATIENT
Start: 2024-08-02 | End: 2024-08-02 | Stop reason: HOSPADM

## 2024-08-02 RX ORDER — SODIUM CHLORIDE 9 MG/ML
INJECTION, SOLUTION INTRAVENOUS CONTINUOUS
Status: DISCONTINUED | OUTPATIENT
Start: 2024-08-02 | End: 2024-08-03

## 2024-08-02 RX ORDER — ONDANSETRON 2 MG/ML
4 INJECTION INTRAMUSCULAR; INTRAVENOUS EVERY 6 HOURS PRN
Status: DISCONTINUED | OUTPATIENT
Start: 2024-08-02 | End: 2024-08-02 | Stop reason: HOSPADM

## 2024-08-02 RX ORDER — DIPHENHYDRAMINE HCL 25 MG
25 CAPSULE ORAL EVERY 4 HOURS PRN
Status: DISCONTINUED | OUTPATIENT
Start: 2024-08-02 | End: 2024-08-07

## 2024-08-02 RX ORDER — HYDROMORPHONE HYDROCHLORIDE 1 MG/ML
0.6 INJECTION, SOLUTION INTRAMUSCULAR; INTRAVENOUS; SUBCUTANEOUS EVERY 5 MIN PRN
Status: DISCONTINUED | OUTPATIENT
Start: 2024-08-02 | End: 2024-08-02 | Stop reason: HOSPADM

## 2024-08-02 RX ORDER — KETOROLAC TROMETHAMINE 30 MG/ML
30 INJECTION, SOLUTION INTRAMUSCULAR; INTRAVENOUS ONCE
Status: COMPLETED | OUTPATIENT
Start: 2024-08-02 | End: 2024-08-02

## 2024-08-02 RX ORDER — BISACODYL 10 MG
10 SUPPOSITORY, RECTAL RECTAL
Status: DISCONTINUED | OUTPATIENT
Start: 2024-08-02 | End: 2024-08-07

## 2024-08-02 RX ORDER — DEXAMETHASONE SODIUM PHOSPHATE 4 MG/ML
VIAL (ML) INJECTION AS NEEDED
Status: DISCONTINUED | OUTPATIENT
Start: 2024-08-02 | End: 2024-08-02 | Stop reason: SURG

## 2024-08-02 RX ORDER — HYDROMORPHONE HYDROCHLORIDE 1 MG/ML
INJECTION, SOLUTION INTRAMUSCULAR; INTRAVENOUS; SUBCUTANEOUS
Status: COMPLETED
Start: 2024-08-02 | End: 2024-08-02

## 2024-08-02 RX ORDER — CEFAZOLIN SODIUM IN 0.9 % NACL 3 G/100 ML
3 INTRAVENOUS SOLUTION, PIGGYBACK (ML) INTRAVENOUS EVERY 8 HOURS
Status: COMPLETED | OUTPATIENT
Start: 2024-08-02 | End: 2024-08-03

## 2024-08-02 RX ORDER — HYDROMORPHONE HYDROCHLORIDE 1 MG/ML
0.2 INJECTION, SOLUTION INTRAMUSCULAR; INTRAVENOUS; SUBCUTANEOUS EVERY 5 MIN PRN
Status: DISCONTINUED | OUTPATIENT
Start: 2024-08-02 | End: 2024-08-02 | Stop reason: HOSPADM

## 2024-08-02 RX ORDER — DIAZEPAM 5 MG/1
5 TABLET ORAL EVERY 6 HOURS PRN
Status: DISCONTINUED | OUTPATIENT
Start: 2024-08-02 | End: 2024-08-07

## 2024-08-02 RX ORDER — NALOXONE HYDROCHLORIDE 0.4 MG/ML
0.08 INJECTION, SOLUTION INTRAMUSCULAR; INTRAVENOUS; SUBCUTANEOUS AS NEEDED
Status: DISCONTINUED | OUTPATIENT
Start: 2024-08-02 | End: 2024-08-02 | Stop reason: HOSPADM

## 2024-08-02 RX ORDER — ONDANSETRON 2 MG/ML
INJECTION INTRAMUSCULAR; INTRAVENOUS AS NEEDED
Status: DISCONTINUED | OUTPATIENT
Start: 2024-08-02 | End: 2024-08-02 | Stop reason: SURG

## 2024-08-02 RX ORDER — MIDAZOLAM HYDROCHLORIDE 1 MG/ML
INJECTION INTRAMUSCULAR; INTRAVENOUS AS NEEDED
Status: DISCONTINUED | OUTPATIENT
Start: 2024-08-02 | End: 2024-08-02 | Stop reason: SURG

## 2024-08-02 RX ORDER — METHOCARBAMOL 100 MG/ML
INJECTION, SOLUTION INTRAMUSCULAR; INTRAVENOUS
Status: COMPLETED
Start: 2024-08-02 | End: 2024-08-02

## 2024-08-02 RX ORDER — KETOROLAC TROMETHAMINE 30 MG/ML
INJECTION, SOLUTION INTRAMUSCULAR; INTRAVENOUS
Status: COMPLETED
Start: 2024-08-02 | End: 2024-08-02

## 2024-08-02 RX ORDER — PROCHLORPERAZINE EDISYLATE 5 MG/ML
5 INJECTION INTRAMUSCULAR; INTRAVENOUS EVERY 8 HOURS PRN
Status: DISCONTINUED | OUTPATIENT
Start: 2024-08-02 | End: 2024-08-02 | Stop reason: HOSPADM

## 2024-08-02 RX ORDER — ACETAMINOPHEN 500 MG
1000 TABLET ORAL ONCE AS NEEDED
Status: DISCONTINUED | OUTPATIENT
Start: 2024-08-02 | End: 2024-08-02 | Stop reason: HOSPADM

## 2024-08-02 RX ORDER — ACETAMINOPHEN 10 MG/ML
1000 INJECTION, SOLUTION INTRAVENOUS ONCE
Status: COMPLETED | OUTPATIENT
Start: 2024-08-02 | End: 2024-08-02

## 2024-08-02 RX ORDER — ROCURONIUM BROMIDE 10 MG/ML
INJECTION, SOLUTION INTRAVENOUS AS NEEDED
Status: DISCONTINUED | OUTPATIENT
Start: 2024-08-02 | End: 2024-08-02 | Stop reason: SURG

## 2024-08-02 RX ORDER — METHOCARBAMOL 750 MG/1
750 TABLET, FILM COATED ORAL EVERY 6 HOURS PRN
Status: DISCONTINUED | OUTPATIENT
Start: 2024-08-02 | End: 2024-08-04

## 2024-08-02 RX ADMIN — ROCURONIUM BROMIDE 20 MG: 10 INJECTION, SOLUTION INTRAVENOUS at 10:26:00

## 2024-08-02 RX ADMIN — ROCURONIUM BROMIDE 50 MG: 10 INJECTION, SOLUTION INTRAVENOUS at 08:54:00

## 2024-08-02 RX ADMIN — SODIUM CHLORIDE: 9 INJECTION, SOLUTION INTRAVENOUS at 12:04:00

## 2024-08-02 RX ADMIN — LIDOCAINE HYDROCHLORIDE 100 MG: 10 INJECTION, SOLUTION EPIDURAL; INFILTRATION; INTRACAUDAL; PERINEURAL at 08:51:00

## 2024-08-02 RX ADMIN — ONDANSETRON 4 MG: 2 INJECTION INTRAMUSCULAR; INTRAVENOUS at 11:33:00

## 2024-08-02 RX ADMIN — GLYCOPYRROLATE 0.4 MG: 0.2 INJECTION, SOLUTION INTRAMUSCULAR; INTRAVENOUS at 11:39:00

## 2024-08-02 RX ADMIN — DEXAMETHASONE SODIUM PHOSPHATE 8 MG: 4 MG/ML VIAL (ML) INJECTION at 09:06:00

## 2024-08-02 RX ADMIN — ROCURONIUM BROMIDE 50 MG: 10 INJECTION, SOLUTION INTRAVENOUS at 09:27:00

## 2024-08-02 RX ADMIN — MIDAZOLAM HYDROCHLORIDE 2 MG: 1 INJECTION INTRAMUSCULAR; INTRAVENOUS at 08:50:00

## 2024-08-02 RX ADMIN — NEOSTIGMINE METHYLSULFATE 2 MG: 1 INJECTION INTRAVENOUS at 11:39:00

## 2024-08-02 RX ADMIN — DEXAMETHASONE SODIUM PHOSPHATE 10 MG: 4 MG/ML VIAL (ML) INJECTION at 11:18:00

## 2024-08-02 NOTE — BRIEF OP NOTE
Pre-Operative Diagnosis: RIGHT LUMBAR RADICULOPATHY     Post-Operative Diagnosis: RIGHT LUMBAR RADICULOPATHY      Procedure Performed:   RIGHT LUMBAR 4-LUMBAR 5, LUMBAR 5-SACRAL 1 MICRODISCECTOMY    Surgeons and Role:     * CATHY Byrne DO - Primary    Assistant(s):  Surgical Assistant.: Migel Weber, RSA     Surgical Findings: adherent disc to dura at L4-5 leading to durotomy repaired primarily and with patch     Specimen: disc material     Estimated Blood Loss: Blood Output: 20 mL (8/2/2024 11:33 AM)      Dictation Number:  to follow    Lesley Byrne DO  8/2/2024  11:43 AM

## 2024-08-02 NOTE — ANESTHESIA PREPROCEDURE EVALUATION
PRE-OP EVALUATION    Patient Name: Be Bueno    Admit Diagnosis: Intractable back pain [M54.9]    Pre-op Diagnosis: RIGHT LUMBAR RADICULOPATHY    RIGHT LUMBAR 4-LUMBAR 5, LUMBAR 5-SACRAL 1 MICRODISCECTOMY    Anesthesia Procedure: RIGHT LUMBAR 4-LUMBAR 5, LUMBAR 5-SACRAL 1 MICRODISCECTOMY (Right: Spine Lumbar)    Surgeons and Role:     * CATHY Byrne, DO - Primary    Pre-op vitals reviewed.  Temp: 98.1 °F (36.7 °C)  Pulse: 86  Resp: 18  BP: 127/68  SpO2: 97 %  Body mass index is 46.96 kg/m².    Current medications reviewed.  Hospital Medications:   [COMPLETED] ondansetron (Zofran) 4 MG/2ML injection 4 mg  4 mg Intravenous Once    [COMPLETED] HYDROmorphone (Dilaudid) 1 MG/ML injection 0.5 mg  0.5 mg Intravenous Q30 Min PRN    [Transfer Hold] buPROPion SR (WELLBUTRIN SR) 12 hr tab 150 mg  150 mg Oral BID    [Transfer Hold] cyclobenzaprine (Flexeril) tab 10 mg  10 mg Oral TID PRN    [Transfer Hold] gabapentin (Neurontin) cap 300 mg  300 mg Oral TID    [Transfer Hold] oxyCODONE-acetaminophen (Percocet) 5-325 MG per tab 1-2 tablet  1-2 tablet Oral Q6H PRN    sodium chloride 0.9% infusion   Intravenous Continuous    [Transfer Hold] acetaminophen (Tylenol Extra Strength) tab 500 mg  500 mg Oral Q4H PRN    [Transfer Hold] morphINE PF 2 MG/ML injection 1 mg  1 mg Intravenous Q2H PRN    Or    [Transfer Hold] morphINE PF 2 MG/ML injection 2 mg  2 mg Intravenous Q2H PRN    Or    [Transfer Hold] morphINE PF 4 MG/ML injection 4 mg  4 mg Intravenous Q2H PRN    [Transfer Hold] polyethylene glycol (PEG 3350) (Miralax) 17 g oral packet 17 g  17 g Oral Daily PRN    [Transfer Hold] sennosides (Senokot) tab 17.2 mg  17.2 mg Oral Nightly PRN    [Transfer Hold] bisacodyl (Dulcolax) 10 MG rectal suppository 10 mg  10 mg Rectal Daily PRN    [Transfer Hold] fleet enema (Fleet) 7-19 GM/118ML rectal enema 133 mL  1 enema Rectal Once PRN    [Transfer Hold] ondansetron (Zofran) 4 MG/2ML injection 4 mg  4 mg Intravenous Q6H  PRN    [Transfer Hold] prochlorperazine (Compazine) 10 MG/2ML injection 5 mg  5 mg Intravenous Q8H PRN    [Transfer Hold] dexamethasone (Decadron) 4 MG/ML injection 6 mg  6 mg Intravenous Q6H       Outpatient Medications:     Medications Prior to Admission   Medication Sig Dispense Refill Last Dose    oxyCODONE-acetaminophen 5-325 MG Oral Tab Take 1-2 tablets by mouth every 6 (six) hours as needed for Pain. 20 tablet 0 2024 at 2130    cyclobenzaprine 10 MG Oral Tab Take 1 tablet (10 mg total) by mouth 3 (three) times daily as needed for Muscle spasms. 30 tablet 1 2024 at 2100    gabapentin 300 MG Oral Cap Take 1 capsule (300 mg total) by mouth 3 (three) times daily.   2024 at 2100    buPROPion  MG Oral Tablet 12 Hr Take 1 tablet (150 mg total) by mouth 2 (two) times daily.   2024 at 0800    methylPREDNISolone (MEDROL) 4 MG Oral Tablet Therapy Pack Take as directed (Patient not taking: Reported on 2024) 1 each 0        Allergies: Patient has no known allergies.      Anesthesia Evaluation    Patient summary reviewed.    Anesthetic Complications  (-) history of anesthetic complications         GI/Hepatic/Renal    Negative GI/hepatic/renal ROS.                             Cardiovascular                (+) obesity                                       Endo/Other                                  Pulmonary  Comment: Smoker                         Neuro/Psych      (+) depression           (+) neuromuscular disease                     Past Surgical History:   Procedure Laterality Date          Tonsillectomy       Social History     Socioeconomic History    Marital status: Single   Tobacco Use    Smoking status: Every Day     Current packs/day: 1.00     Types: Cigarettes    Smokeless tobacco: Never   Vaping Use    Vaping status: Never Used   Substance and Sexual Activity    Alcohol use: Not Currently    Drug use: Never     History   Drug Use Unknown     Available pre-op labs  reviewed.  Lab Results   Component Value Date    WBC 7.9 07/31/2024    RBC 5.39 (H) 07/31/2024    HGB 12.1 07/31/2024    HCT 39.0 07/31/2024    MCV 72.4 (L) 07/31/2024    MCH 22.4 (L) 07/31/2024    MCHC 31.0 07/31/2024    RDW 15.8 07/31/2024    .0 07/31/2024     Lab Results   Component Value Date     07/31/2024    K 4.0 07/31/2024     07/31/2024    CO2 25.0 07/31/2024    BUN 6 (L) 07/31/2024    CREATSERUM 0.76 07/31/2024     (H) 07/31/2024    CA 9.4 07/31/2024     Lab Results   Component Value Date    INR 0.90 07/23/2024         Airway      Mallampati: II  Mouth opening: >3 FB  TM distance: > 6 cm  Neck ROM: full Cardiovascular    Cardiovascular exam normal.  Rhythm: regular  Rate: normal     Dental  Comment: Denies chipped or loose teeth           Pulmonary    Pulmonary exam normal.  Breath sounds clear to auscultation bilaterally.               Other findings              ASA: 2   Plan: general  NPO status verified and patient meets guidelines.    Post-procedure pain management plan discussed with surgeon and patient.    Comment: Risks include but limited to oral and dental injury, nausea/vomiting, anaphylaxis, prolonged ventilation and myocardial infarction. All questions were answered to the patient's satisfaction. Patient expressed understanding and wishes to proceed.   Plan/risks discussed with: patient                Present on Admission:  **None**

## 2024-08-02 NOTE — ANESTHESIA PROCEDURE NOTES
Airway  Date/Time: 8/2/2024 8:53 AM  Urgency: elective    Airway not difficult    General Information and Staff    Patient location during procedure: OR  Anesthesiologist: Trixie Orozco MD  Performed: anesthesiologist   Performed by: Trixie Orozco MD  Authorized by: Trixie Orozco MD      Indications and Patient Condition  Indications for airway management: anesthesia  Spontaneous Ventilation: absent  Sedation level: deep  Preoxygenated: yes  Patient position: sniffing  Mask difficulty assessment: 0 - not attempted    Final Airway Details  Final airway type: endotracheal airway      Successful airway: ETT  Cuffed: yes   Successful intubation technique: Video laryngoscopy  Endotracheal tube insertion site: oral  Blade: GlideScope  Blade size: #3  ETT size (mm): 7.5    Cormack-Lehane Classification: grade I - full view of glottis  Placement verified by: bronchoscopy and capnometry   Measured from: lips  ETT to lips (cm): 21  Number of attempts at approach: 1    Additional Comments  High airway pressures post intubation. Bronch showed deviation in trachea causing the opening of ETT to occlude against tissue. ETT retracted.

## 2024-08-02 NOTE — PAYOR COMM NOTE
--------------OBSERVATION ADMISSION   INPATIENT ADMISSION 24          ADMISSION REVIEW     Payor: [employer] OPEN [employer]ESS   Subscriber #:  [id number]  Authorization Number: [id number]    Admit date: 24  Admit time: 10:01 AM       REVIEW DOCUMENTATION:     Patient Seen in: [facility] Emergency Department      History     Chief Complaint   Patient presents with    Back Pain     Stated Complaint: c/o back pain, states scheduled for Microdiscectomy on . Taking \"Oxy, Gabape*    Subjective:   HPI    28-year-old female present  with back pain.  Patient was seen 2 days ago for similar symptoms she is having lower back pain radiating down the leg to the foot.  She is scheduled for surgery earlier this upcoming month.  She is having trouble controlling her pain as an outpatient causing her to return to the emergency department.  She denies loss of bowel or bladder function or any other complaints.    Objective:   Past Medical History:    Back problem    Depression    Muscle weakness    RIGHT LEG     Past Surgical History:   Procedure Laterality Date          Tonsillectomy         Physical Exam     ED Triage Vitals [24 0005]   BP (!) 175/83   Pulse 98   Resp 20   Temp 98.3 °F (36.8 °C)   Temp src Temporal   SpO2 97 %   O2 Device None (Room air)       Current Vitals:   Vital Signs  BP: (!) 175/83  Pulse: 98  Resp: 20  Temp: 98.3 °F (36.8 °C)  Temp src: Temporal    Oxygen Therapy  SpO2: 97 %  O2 Device: None (Room air)      Disposition and Plan     Clinical Impression:  1. Intractable back pain         Disposition:  Admit  2024  2:20 am      Signed by Arjun Leon MD on 2024  2:21 AM         gabapentin (Neurontin) cap 300 mg  Dose: 300 mg  Freq: 3 times daily Route: OR  Start: 24 0900   ondansetron (Zofran) 4 MG/2ML injection 4 mg  Dose: 4 mg  Freq: Once Route: IV  Start: 24 End: 24 030  sodium chloride 0.9% infusion  Rate: 83 mL/hr  Freq: Continuous Route:  IV  Start: 07/31/24 0400  cyclobenzaprine (Flexeril) tab 10 mg  Dose: 10 mg  Freq: 3 times daily PRN Route: OR  PRN Reason: Muscle spasms  Start: 07/31/24 0345   X 3  HYDROmorphone (Dilaudid) 1 MG/ML injection 0.5 mg  Dose: 0.5 mg  Freq: Every 30 min PRN Route: IV  PRN Reason: severe pain  Start: 07/31/24 0247 End: 07/31/24 0729X3  morphINE PF 4 MG/ML injection 4 mg  Dose: 4 mg  Freq: Every 2 hour PRN Route: IV  PRN Reason: severe pain  Start: 07/31/24 0345 X2  oxyCODONE-acetaminophen (Percocet) 5-325 MG per tab 1-2 tablet  Dose: 1-2 tablet  Freq: Every 6 hours PRN Route: OR  PRN Reason: moderate pain X4      NEUROSURGERY CONSULT  7-31-24     She called our offices last week stating her symptoms had progressed and she was told to come to the ED for evaluation.  She was treated with meds and wanted to go home.  She now returns with poor pain control and worsening numbness.  She has no weakness and no bowel/bladder habit changes.      REVIEW OF STUDIES:  MRI shows herniated disc on R at L4-5 and L5-S1        ASSESSMENT AND PLAN:  RLE radiculopthy  Discussed with patient and will plan for R L4-S1 NETTA this Friday  Obtain medical clearance  Start steroids'        8-1 REVIEW DOCUMENTATION        Pt examined, reports improvement in pain with initiation of steroids. Continues to report numbness and tingling to the distal RLE below the knee. Denies any new numbness, tingling, weakness or pain. Denies changes in bowel or bladder habits       GENERAL: No acute distress, non-toxic appearing, mood appropriate     HEENT: Normocephalic, atraumatic     RESP:  Respirations non-labored, even     CV:  NSR on tele     NEURO: Alert and oriented x3.  Sensation to light touch is intact bilaterally.  RICO x 4. Gait deferred. Strength 5/5 bilaterally.       ASSESSMENT & PLAN      ASSESSMENT:  Intractable RLE radiculopathy secondary to right L4-5, L5-S1 disc herniations      PLAN:  Plan for OR tomorrow for L4-S1 microdiscectomy by Dr. Byrne    Medical clearance per hospitalist   Repeat Type & Screen (previous lab >72 hours old)  NPO at midnight  Verify informed consent   Pain medications as ordered  Continue Decadron 4 mg q6h   Medical management per hospitalist  DVT prophylaxis - Gwen Mclaughlin      Date of Service: 8/2/2024 11:43 AM     Signed         Pre-Operative Diagnosis: RIGHT LUMBAR RADICULOPATHY     Post-Operative Diagnosis: RIGHT LUMBAR RADICULOPATHY      Procedure Performed:   RIGHT LUMBAR 4-LUMBAR 5, LUMBAR 5-SACRAL 1 MICRODISCECTOMY     Surgeons and Role:     * CATHY Byrne DO - Primary     Assistant(s):  Surgical Assistant.: Migel Weber, RSA     Surgical Findings: adherent disc to dura at L4-5 leading to durotomy repaired primarily and with patch                  MEDICATIONS ADMINISTERED IN LAST 1 DAY:  acetaminophen (Ofirmev) 10 mg/mL infusion premix 1,000 mg       Date Action Dose Route User    8/2/2024 1213 New Bag 1,000 mg Intravenous Lilia Goodman RN          acetaminophen (Ofirmev) 10 mg/mL infusion premix       Date Action Dose Route User    8/2/2024 1213 New Bag 1,000 mg Intravenous Lilia Goodman RN          bupivacaine 0.5%-EPINEPHrine 1:200,000 PF (Marcaine/Epinephrine PF) injection       Date Action Dose Route User    8/2/2024 1120 Given 10 mL Infiltration (Back) CATHY Byrne,           buPROPion SR (WELLBUTRIN SR) 12 hr tab 150 mg       Date Action Dose Route User    8/1/2024 2119 Given 150 mg Oral Radha Barcenas RN          ceFAZolin (Ancef) 2g in 10mL IV syringe premix       Date Action Dose Route User    8/2/2024 0854 Given 2 g Intravenous Trixie Orozco MD          dexamethasone (Decadron) 4 MG/ML injection 6 mg       Date Action Dose Route User    8/2/2024 0515 Given 6 mg Intravenous Radha Barcenas RN    8/1/2024 2331 Given 6 mg Intravenous Radha Barcenas RN    8/1/2024 1640 Given 6 mg Intravenous Lady Martins RN          dexamethasone (Decadron) 4 MG/ML injection       Date Action Dose  Route User    8/2/2024 1118 Given 10 mg Intravenous Pam, Trixie Enriquez MD    8/2/2024 0906 Given 8 mg Intravenous Pam, Trixie Enriquez MD          fentaNYL (Sublimaze) 50 mcg/mL injection 50 mcg       Date Action Dose Route User    8/2/2024 1233 Given by Other 50 mcg Intravenous Lilia Goodman RN    8/2/2024 1223 Given 50 mcg Intravenous Lilia Goodman RN          fentaNYL (Sublimaze) 50 mcg/mL injection       Date Action Dose Route User    8/2/2024 1142 Given 50 mcg Intravenous Pam, Trixie Enriquez MD    8/2/2024 1120 Given 50 mcg Intravenous Pam, Trixie Enriquez MD    8/2/2024 1026 Given 100 mcg Intravenous Pam, Trixie Enriquez MD    8/2/2024 0922 Given 50 mcg Intravenous Pam, Trixie Enriquez MD    8/2/2024 0919 Given 50 mcg Intravenous Pam, Trixie Enriquez MD    8/2/2024 0907 Given 50 mcg Intravenous Pam, Trixie Enriquez MD          fentaNYL (Sublimaze) 50 mcg/mL injection       Date Action Dose Route User    8/2/2024 1223 Given 50 mcg Intravenous Lilia Goodman RN          gabapentin (Neurontin) cap 300 mg       Date Action Dose Route User    8/1/2024 2119 Given 300 mg Oral SwapnaRadha cunningham RN    8/1/2024 1640 Given 300 mg Oral Lady Martins RN          glycopyrrolate (Robinul) 0.2 MG/ML injection       Date Action Dose Route User    8/2/2024 1139 Given 0.4 mg Intravenous Trixie Orozco MD          HYDROmorphone (Dilaudid) 1 MG/ML injection 0.4 mg       Date Action Dose Route User    8/2/2024 1326 Given by Other 0.4 mg Intravenous Lilia Goodman RN    8/2/2024 1317 Given 0.4 mg Intravenous Lilia Goodman RN    8/2/2024 1309 Given 0.4 mg Intravenous Lilia Goodman RN    8/2/2024 1259 Given by Other 0.4 mg Intravenous Lilia Goodman RN    8/2/2024 1250 Given by Other 0.4 mg Intravenous Lilia Goodman, RN          HYDROmorphone (Dilaudid) 1 MG/ML injection 0.6 mg       Date Action Dose Route User    8/2/2024 1243 Given by Other 0.6 mg Intravenous Maxine  REBECA Rodrigues          HYDROmorphone (Dilaudid) 1 MG/ML injection       Date Action Dose Route User    8/2/2024 1243 Given by Other 0.6 mg Intravenous Lilia Goodman RN          HYDROmorphone (Dilaudid) 1 MG/ML injection       Date Action Dose Route User    8/2/2024 1259 Given by Other 0.4 mg Intravenous Lilia Goodman RN          HYDROmorphone (Dilaudid) 1 MG/ML injection       Date Action Dose Route User    8/2/2024 1317 Given 0.4 mg Intravenous Lilia Goodman RN          ketorolac (Toradol) 30 MG/ML injection 30 mg       Date Action Dose Route User    8/2/2024 1209 Given 30 mg Intravenous Lilia Goodman RN          ketorolac (Toradol) 30 MG/ML injection       Date Action Dose Route User    8/2/2024 1209 Given 30 mg Intravenous Lilia Goodman RN          lidocaine PF (Xylocaine-MPF) 1% injection       Date Action Dose Route User    8/2/2024 0851 Given 100 mg Injection Trixie Orozco MD          methocarbamol (Robaxin) 1000 MG/10ML injection 1,000 mg       Date Action Dose Route User    8/2/2024 1212 Given 1,000 mg Intravenous Lilia Goodman RN          methocarbamol (Robaxin) 1000 MG/10ML injection       Date Action Dose Route User    8/2/2024 1212 Given 1,000 mg Intravenous Lilia Goodman RN          midazolam (Versed) 2 MG/2ML injection       Date Action Dose Route User    8/2/2024 0850 Given 2 mg Intravenous Trixie Orozco MD          morphINE PF 4 MG/ML injection 4 mg       Date Action Dose Route User    8/2/2024 0437 Given 4 mg Intravenous Radha Barcenas RN          neostigmine (Bloxiverz) 10 mg/10mL injection       Date Action Dose Route User    8/2/2024 1139 Given 2 mg Intravenous Trixie Orozco MD          ondansetron (Zofran) 4 MG/2ML injection       Date Action Dose Route User    8/2/2024 1133 Given 4 mg Intravenous Trixie Orozco MD          oxyCODONE-acetaminophen (Percocet) 5-325 MG per tab 1-2 tablet       Date Action Dose Route User     8/1/2024 2331 Given 2 tablet Oral Radha Barcenas RN    8/1/2024 1640 Given 2 tablet Oral Lady Martins RN          propofol (Diprivan) 10 MG/ML injection       Date Action Dose Route User    8/2/2024 1143 Given 100 mg Intravenous Pam, Trixie Enriquez MD    8/2/2024 1136 Given 100 mg Intravenous Pam, Trixie Enriquez MD    8/2/2024 0851 Given 260 mg Intravenous Pam, Trixie Enriquez MD          rocuronium (Zemuron) 50 mg/5mL injection       Date Action Dose Route User    8/2/2024 1026 Given 20 mg Intravenous Pam, Trixie Enriquez MD    8/2/2024 0927 Given 50 mg Intravenous Pam, Trixie Enriquez MD    8/2/2024 0854 Given 50 mg Intravenous Pam, Trixie Enriquez MD          sennosides (Senokot) tab 17.2 mg       Date Action Dose Route User    8/1/2024 1646 Given 17.2 mg Oral Lady Martins RN          sodium chloride 0.9% infusion       Date Action Dose Route User    8/2/2024 1240 New Bag (none) Intravenous Lilia Goodman RN    8/2/2024 1223 Rate/Dose Change (none) Intravenous Lilia Goodman RN    8/2/2024 0846 Continued by Anesthesia (none) Intravenous Pam, Trixie Enriquez MD    8/2/2024 0437 New Bag (none) Intravenous Radha Barcenas RN          succinylcholine (Anectine) 20 MG/ML injection       Date Action Dose Route User    8/2/2024 0851 Given 140 mg Intravenous Pam, Trixie Enriquez MD            Vitals (last day)       Date/Time Temp Pulse Resp BP SpO2 Weight O2 Device O2 Flow Rate (L/min) [facility]    08/02/24 1404 98.7 °F (37.1 °C) 74 11 132/87 98 % -- Nasal cannula 2 L/min     08/02/24 1350 -- -- -- -- 100 % -- Nasal cannula 2 L/min MS    08/02/24 1335 99.2 °F (37.3 °C) -- -- -- -- -- Nasal cannula 2 L/min MS    08/02/24 1326 -- -- -- -- -- -- Nasal cannula 2 L/min MS    08/02/24 1320 -- 57 11 119/72 93 % -- None (Room air) -- MS    08/01/24 1237 98 °F (36.7 °C) 76 17 157/76 93 % -- None (Room air) 0 L/min TS    08/01/24 0734 97.9 °F (36.6 °C) 84 16 136/63 -- -- None (Room air) 0 L/min TS    08/01/24  0417 98.2 °F (36.8 °C) 87 16 134/72 -- -- None (Room air) 0 L/min CM    08/01/24 0019 98.5 °F (36.9 °C) 97 16 145/75 -- -- None (Room air) 0 L/min CM

## 2024-08-02 NOTE — PROGRESS NOTES
[facility]   part of [facility]     Hospitalist Progress Note     Be Bueno Patient Status:  Observation    1995 MRN [id number]   Location [facility] 3SW-A Attending Rhiannon Lemus,    Hosp Day # 1 PCP Leonel West MD     Chief Complaint: Flank pain    Subjective:     Patient states back pain and radiculopathy stable, plan for surgery later today    Objective:    Review of Systems:   A comprehensive review of systems was completed; pertinent positive and negatives stated in subjective.    Vital signs:  Temp:  [97.9 °F (36.6 °C)-99.2 °F (37.3 °C)] 98.7 °F (37.1 °C)  Pulse:  [] 74  Resp:  [11-20] 11  BP: (119-162)/(67-97) 132/87  SpO2:  [93 %-100 %] 98 %    Physical Exam:    General: No acute distress  Respiratory: No wheezes, no rhonchi  Cardiovascular: S1, S2, regular rate and rhythm  Abdomen: Soft, Non-tender, non-distended, positive bowel sounds  Neuro: No new focal deficits.   Extremities: No edema      Diagnostic Data:    Labs:  Recent Labs   Lab 24  0257   WBC 7.9   HGB 12.1   MCV 72.4*   .0       Recent Labs   Lab 24  0257   *   BUN 6*   CREATSERUM 0.76   CA 9.4   ALB 4.2      K 4.0      CO2 25.0   ALKPHO 81   AST 34*   ALT 46   BILT <0.2*   TP 7.1       Estimated Creatinine Clearance: 107.2 mL/min (based on SCr of 0.76 mg/dL).    No results for input(s): \"TROP\", \"TROPHS\", \"CK\" in the last 168 hours.    No results for input(s): \"PTP\", \"INR\" in the last 168 hours.               Microbiology    No results found for this visit on 24.      Imaging: Reviewed in Epic.    Medications:    [Transfer Hold] buPROPion SR  150 mg Oral BID    [Transfer Hold] gabapentin  300 mg Oral TID    [Transfer Hold] dexamethasone  6 mg Intravenous Q6H       Assessment & Plan:      #Right lower extremity radiculopathy  #Severe L4-5 and moderate to severe L3-4 and L5-S1 canal stenosis secondary to large disc herniations (seen on MRI on  6/17)  -Neurosurgery following, plan for surgical intervention later today  -Medically optimized for surgery, low risk explained to patient  -Decadron (7/31-)  -Gabapentin    #Morbid obesity  -BMI 46.96    Rhiannon Lemus DO    Supplementary Documentation:     Quality:  DVT Mechanical Prophylaxis:   SCDs, Early ambuation  DVT Pharmacologic Prophylaxis   Medication   None                Code Status: Not on file  Rodriguez: Rodriguez catheter in place  Rodriguez Duration (in days):   Central line:    MAYTE:     Discharge is dependent on: post op recovery  At this point Ms. Bueno is expected to be discharge to: tbd    The 21st Century Cures Act makes medical notes like these available to patients in the interest of transparency. Please be advised this is a medical document. Medical documents are intended to carry relevant information, facts as evident, and the clinical opinion of the practitioner. The medical note is intended as peer to peer communication and may appear blunt or direct. It is written in medical language and may contain abbreviations or verbiage that are unfamiliar.

## 2024-08-02 NOTE — ANESTHESIA POSTPROCEDURE EVALUATION
[facility] Patient Status:  Inpatient   Age/Gender 28 year old female MRN [id number]   Location [facility] SURGERY Attending Rhiannon Lemus DO   Hosp Day # 1 PCP Leonel West MD       Anesthesia Post-op Note    RIGHT LUMBAR 4-LUMBAR 5, LUMBAR 5-SACRAL 1 MICRODISCECTOMY    Procedure Summary       Date: 08/02/24 Room / Location:  MAIN OR  /  MAIN OR    Anesthesia Start: 0846 Anesthesia Stop: 1204    Procedure: RIGHT LUMBAR 4-LUMBAR 5, LUMBAR 5-SACRAL 1 MICRODISCECTOMY (Right: Spine Lumbar) Diagnosis: (RIGHT LUMBAR RADICULOPATHY)    Surgeons: CATHY Byrne DO Anesthesiologist: Trixie Orozco MD    Anesthesia Type: general ASA Status: 2            Anesthesia Type: general    Vitals Value Taken Time   /84 08/02/24 1204   Temp 98 08/02/24 1204   Pulse 90 08/02/24 1203   Resp 13 08/02/24 1203   SpO2 100 08/02/24 1204   Vitals shown include unfiled device data.    Patient Location: PACU    Anesthesia Type: general    Airway Patency: patent and extubated    Postop Pain Control: adequate    Mental Status: mildly sedated but able to meaningfully participate in the post-anesthesia evaluation    Nausea/Vomiting: none    Cardiopulmonary/Hydration status: stable euvolemic    Complications: no apparent anesthesia related complications    Postop vital signs: stable    Dental Exam: Unchanged from Preop    Patient to be discharged from PACU when criteria met.

## 2024-08-02 NOTE — PLAN OF CARE
Ivf infusing.  States right hip pain today.  Discussed isabelle-op routine.  Verbalized understanding.  Denies need for pain medication.  To surgery via bed, spouse at bedside.

## 2024-08-02 NOTE — PLAN OF CARE
Alert and oriented x 4. VSS. Pain controlled with scheduled gabapentin and prn meds. RLE with weakness. Denies N/T to bilateral lower extremities. Voiding without difficulty. Last BM 7/30/24. Tolerating regular diet- pt to be NPO at midnight for surgery @ 0800 tomorrow morning. Ambulating independently.  Plan of care discussed with patient.

## 2024-08-03 LAB
ANION GAP SERPL CALC-SCNC: 4 MMOL/L (ref 0–18)
BUN BLD-MCNC: 13 MG/DL (ref 9–23)
CALCIUM BLD-MCNC: 9 MG/DL (ref 8.7–10.4)
CHLORIDE SERPL-SCNC: 109 MMOL/L (ref 98–112)
CO2 SERPL-SCNC: 26 MMOL/L (ref 21–32)
CREAT BLD-MCNC: 0.7 MG/DL
EGFRCR SERPLBLD CKD-EPI 2021: 121 ML/MIN/1.73M2 (ref 60–?)
GLUCOSE BLD-MCNC: 120 MG/DL (ref 70–99)
HCT VFR BLD AUTO: 34 %
HGB BLD-MCNC: 10.6 G/DL
OSMOLALITY SERPL CALC.SUM OF ELEC: 289 MOSM/KG (ref 275–295)
POTASSIUM SERPL-SCNC: 4.3 MMOL/L (ref 3.5–5.1)
SODIUM SERPL-SCNC: 139 MMOL/L (ref 136–145)

## 2024-08-03 PROCEDURE — 00QT0ZZ REPAIR SPINAL MENINGES, OPEN APPROACH: ICD-10-PCS | Performed by: NEUROLOGICAL SURGERY

## 2024-08-03 PROCEDURE — 0SB20ZZ EXCISION OF LUMBAR VERTEBRAL DISC, OPEN APPROACH: ICD-10-PCS | Performed by: NEUROLOGICAL SURGERY

## 2024-08-03 PROCEDURE — 01NR0ZZ RELEASE SACRAL NERVE, OPEN APPROACH: ICD-10-PCS | Performed by: NEUROLOGICAL SURGERY

## 2024-08-03 PROCEDURE — 0SB40ZZ EXCISION OF LUMBOSACRAL DISC, OPEN APPROACH: ICD-10-PCS | Performed by: NEUROLOGICAL SURGERY

## 2024-08-03 PROCEDURE — 01NB0ZZ RELEASE LUMBAR NERVE, OPEN APPROACH: ICD-10-PCS | Performed by: NEUROLOGICAL SURGERY

## 2024-08-03 PROCEDURE — 99232 SBSQ HOSP IP/OBS MODERATE 35: CPT | Performed by: HOSPITALIST

## 2024-08-03 RX ORDER — KETOROLAC TROMETHAMINE 15 MG/ML
30 INJECTION, SOLUTION INTRAMUSCULAR; INTRAVENOUS EVERY 6 HOURS
Status: COMPLETED | OUTPATIENT
Start: 2024-08-03 | End: 2024-08-05

## 2024-08-03 RX ORDER — ENOXAPARIN SODIUM 100 MG/ML
0.5 INJECTION SUBCUTANEOUS 2 TIMES DAILY
Status: DISCONTINUED | OUTPATIENT
Start: 2024-08-03 | End: 2024-08-04

## 2024-08-03 NOTE — PHYSICAL THERAPY NOTE
PT orders received, chart reviewed. Pt is s/p R L4-S1 NETTA with Dr. Byrne on 8/2 and is to remain on flat bedrest for 48 hours post op per orders. Will hold and f/u as appropriate. RN aware. CM

## 2024-08-03 NOTE — PROGRESS NOTES
[facility]   part of [facility]     Hospitalist Progress Note     Be Bueno Patient Status:  Observation    1995 MRN [id number]   Location [facility] 3SW-A Attending Rhiannon Lemus,    Hosp Day # 2 PCP Leonel West MD     Chief Complaint: Flank pain    Subjective:     Patient seen and examined. C/o back pain due to laying flat.     Objective:    Review of Systems:   A comprehensive review of systems was completed; pertinent positive and negatives stated in subjective.    Vital signs:  Temp:  [97.8 °F (36.6 °C)-98.7 °F (37.1 °C)] 97.9 °F (36.6 °C)  Pulse:  [52-87] 66  Resp:  [11-18] 18  BP: (115-134)/(53-87) 128/62  SpO2:  [96 %-100 %] 98 %    Physical Exam:    General: No acute distress  Respiratory: No wheezes, no rhonchi  Cardiovascular: S1, S2, regular rate and rhythm  Abdomen: Soft, Non-tender, non-distended, positive bowel sounds  Neuro: No new focal deficits.   Extremities: No edema      Diagnostic Data:    Labs:  Recent Labs   Lab 24  0257 24  0427   WBC 7.9  --    HGB 12.1 10.6*   MCV 72.4*  --    .0  --        Recent Labs   Lab 24  0257 24  0427   * 120*   BUN 6* 13   CREATSERUM 0.76 0.70   CA 9.4 9.0   ALB 4.2  --     139   K 4.0 4.3    109   CO2 25.0 26.0   ALKPHO 81  --    AST 34*  --    ALT 46  --    BILT <0.2*  --    TP 7.1  --        Estimated Creatinine Clearance: 116.4 mL/min (based on SCr of 0.7 mg/dL).    No results for input(s): \"TROP\", \"TROPHS\", \"CK\" in the last 168 hours.    No results for input(s): \"PTP\", \"INR\" in the last 168 hours.               Microbiology    No results found for this visit on 24.      Imaging: Reviewed in Epic.    Medications:    enoxaparin  0.5 mg/kg Subcutaneous BID    ketorolac  30 mg Intravenous q6h    sennosides  17.2 mg Oral Nightly    docusate sodium  100 mg Oral BID    buPROPion SR  150 mg Oral BID    gabapentin  300 mg Oral TID       Assessment & Plan:      #Right  lower extremity radiculopathy  #Severe L4-5 and moderate to severe L3-4 and L5-S1 canal stenosis secondary to large disc herniations (seen on MRI on 6/17)  -S/p right L4-L5 and L5-S1 microdiscectomy 8/2  -Neurosurgery following  -PT/OT when able    #Morbid obesity  -Body mass index is 46.96 kg/m².    Shiraz Hidalgo,     Supplementary Documentation:     Quality:  DVT Mechanical Prophylaxis: DAMARIS hose, DEVONTEs, Early ambuation  DVT Pharmacologic Prophylaxis   Medication    enoxaparin (Lovenox) 80 MG/0.8ML SUBQ injection 70 mg                Code Status: Not on file  Rodriguez: Rodriguez catheter in place  Rodriguez Duration (in days):   Central line:    MAYTE:     Discharge is dependent on: post op recovery  At this point Ms. Bueno is expected to be discharge to: tbd    The 21st Century Cures Act makes medical notes like these available to patients in the interest of transparency. Please be advised this is a medical document. Medical documents are intended to carry relevant information, facts as evident, and the clinical opinion of the practitioner. The medical note is intended as peer to peer communication and may appear blunt or direct. It is written in medical language and may contain abbreviations or verbiage that are unfamiliar.

## 2024-08-03 NOTE — OPERATIVE REPORT
Date of surgery  8/2/24      Preoperative dx  herniated disc at L4-5 and L5-S1 on R      Postoperative dx  same      Procedure [please list them in numbered format]   1. Posterior midline approach to lumbar spine from L4-S1 on R   2. (R side) laminectomies at levels L4-5 and L5-S1 , medial facetectomies at levels L4-5 and L5-S1, and foraminotomies at levels L4-5 and L5-S1   3. Use of microscope for decompression   4. R-sided [Micro]Diskectomy at L4-5 and L5-1   5. Use of radiographs for vertebral level localization  6. Primary repair of incidental durotomy at L4-5 on R      Surgeon[s]  Caty   Assistant Sheridan Weber      Anesthesia  GETA      IVF  1500 EBL 20   Uout NR   Specimen  none      Drains  none     Complications  incidental durotomy repaired primarily and with patch on R at L4-5     Condition  Stable to PACU      Indications      This is a 29 YO F with intractable back pain and RLE radiculopathy, having failed [all nonoperative management]. It was mutually decided that surgical intervention was the best option at this point. All risks and benefits of the surgery were explained to the patient, and he/she wishes to proceed with the surgery.      Details of surgery      Patient was taken to the OR, and placed under general anesthesia. [SHe] was then turned prone onto an OR table with Sandeep frame. All bony prominences were well-padded. The lower back was then prepped and draped in standard fashion. A 10-blade scalpel was used to make a vertical longitudinal incision approximately spanning the posterior processes of L4-S1. Bovie cautery was then used to deepen the incision thru the subcutaneous tissues and fascia down to the spinous processes. A Kocher clamp was applied to a spinous process and a lateral radiograph taken to confirm the vertebral level.   With the level radiographically confirmed, the posterior elements of the vertebral bodies from the inferior half of L4 to the superior half of S1 were exposed  in subperiosteal fashion.    Self retaining retractors were placed for good self-retaining exposure. [The microscope was positioned at this point.] The decompression was then begun.  The decompression was the same on the right at L4-5 and L5-S1 starting at L4-5. R sided laminotomy was performed with a high speed simon and Kerrison rongeurs of various sizes, along with bilateral medial facetectomies and foraminotomies. An incidental durotomy occurred at L4-5 which was isolated and repaired with 6-0 Prolene primarily and with a fat patch-with good seal.       The nerve root was visualized and a large axillary disc herniation was noted and carefully removed with protective retractors on the dura and nerve root.  With removal of the extrusion both the nerve root and retractor appeared to be in a relaxed position.  Disc space irrigation was placed within the disc space and no further fragments were noted.     Closure was then commenced. The fascia was closed using #1 Vicryl in interrupted fashion. The subcutaneous tissue was closed using #1 Vicryl as well. The dermis was closed using 2-0 Vicryl in interrupted fashion. The skin was closed with a subcuticular 3-0 Biosyn running suture. Mastisol and steri-strips were applied. Xeroform was then applied, followed by 4x4 gauze and microporous tape to secure the dressing and drain. All needle and sponge counts were correct. Duraseal was placed over the laminotomy defects prior to closure to further ensure a good heal.  The patient was revived, extubated, and taken to recovery room in stable condition.

## 2024-08-03 NOTE — PLAN OF CARE
Post-op day 1. Dressing is clean, dry, and intact. Flat bedrest ordered until 8/4 at 1630 with confirmation from Dr. Byrne that the order may be extended until Monday after assessment on 8/4. Bilateral SCD's. Oxygen via nasal cannula as needed. Room air. Continuous pulse oximeter. Incentive spirometer. Telemetry monitoring. Cardiac electrolyte protocol. Lovenox added per Dr. Byrne starting today on 8/3. Rodriguez care provided per protocol. Physical and occupational therapy evaluations pending. Toradol added for pain management. Regular diet. Plan is home when medically stable.

## 2024-08-03 NOTE — PROGRESS NOTES
Occupational Therapy    Orders received and chart review completed. Pt is s/p R L4-S1 NETTA with Dr. Byrne 8/2 and is to remain on flat bedrest for 48hrs post-op per orders and confirmed with RN. Will hold and f/u as appropriate.

## 2024-08-03 NOTE — PROGRESS NOTES
[facility]   part of [facility]    Neurosurgery Progress Note  8/3/2024    Be Bueno Patient Status:  Inpatient    1995 MRN [id number]   Location [facility] 3SW-A Attending Shiraz Hidalgo DO   Hosp Day # 2 PCP Leonel West MD     SUBJECTIVE:  Be Bueno is a(n) 28 year old female s/p L4-S1 R NETTA with intra-op durotomy repaired primarily.    Denies any headaches.  States preop leg symptoms are improved.      OBJECTIVE / PHYSICAL EXAM:  Vital Signs:  Blood pressure 126/71, pulse 52, temperature 97.8 °F (36.6 °C), temperature source Oral, resp. rate 16, weight 299 lb 13.2 oz (136 kg), last menstrual period 2024, SpO2 98%.    Neuro: stable    Incision: c/d/I, dressing is dry and in place      Lab Results (last 24 hours):  Recent Results (from the past 24 hour(s))   Hemoglobin & Hematocrit    Collection Time: 24  4:27 AM   Result Value Ref Range    HGB 10.6 (L) 12.0 - 16.0 g/dL    HCT 34.0 (L) 35.0 - 48.0 %   Basic Metabolic Panel (8)    Collection Time: 24  4:27 AM   Result Value Ref Range    Glucose 120 (H) 70 - 99 mg/dL    Sodium 139 136 - 145 mmol/L    Potassium 4.3 3.5 - 5.1 mmol/L    Chloride 109 98 - 112 mmol/L    CO2 26.0 21.0 - 32.0 mmol/L    Anion Gap 4 0 - 18 mmol/L    BUN 13 9 - 23 mg/dL    Creatinine 0.70 0.55 - 1.02 mg/dL    Calcium, Total 9.0 8.7 - 10.4 mg/dL    Calculated Osmolality 289 275 - 295 mOsm/kg    eGFR-Cr 121 >=60 mL/min/1.73m2       Review of Imaging  none    Assessment/Plan:  S/p L4-S1 NETTA  Flat in bed til Monday  Start Lovenox  Remove fluids  Start Toradol for back pain    Lesley Byrne DO    8/3/2024  10:09 AM

## 2024-08-03 NOTE — PLAN OF CARE
Returned from pacu via bed.  Ivf infusing.  Back dressing clean, dry, intact.  Color, movement, sensation to all extremities intact.  Denies headache.  Sleepy, easily awakened.  Denies pain.  Rodriguez draining clear yellow urine.  Instructed on plan of care, call for all asst needed, discomfort felt,.  Verbalized understanding.  Remains on flat bedrest.

## 2024-08-03 NOTE — PLAN OF CARE
A&O x4. VSS, 2L O2 nc prn. . Back pain well controlled with PO and IV medication. Flat BR overnight, denies HA. Rodriguez catheter draining without difficulty. Bilat teds/SCDs. IVF infusing as ordered. IV ancef post op. CMS intact. Telfa/tegaderm dressing to back C/D/I. Reviewed POC, pain management, IS use, and fall precautions with pt. Bed alarm on w/bed in lowest position. Pt reminded to use call light. Verbalized understanding. PT/OT to see.     2300 - care endorsed to REBECA Chen.

## 2024-08-04 LAB
ANION GAP SERPL CALC-SCNC: 4 MMOL/L (ref 0–18)
BUN BLD-MCNC: 15 MG/DL (ref 9–23)
CALCIUM BLD-MCNC: 9.1 MG/DL (ref 8.7–10.4)
CHLORIDE SERPL-SCNC: 108 MMOL/L (ref 98–112)
CO2 SERPL-SCNC: 27 MMOL/L (ref 21–32)
CREAT BLD-MCNC: 0.7 MG/DL
EGFRCR SERPLBLD CKD-EPI 2021: 121 ML/MIN/1.73M2 (ref 60–?)
GLUCOSE BLD-MCNC: 109 MG/DL (ref 70–99)
OSMOLALITY SERPL CALC.SUM OF ELEC: 289 MOSM/KG (ref 275–295)
POTASSIUM SERPL-SCNC: 4.2 MMOL/L (ref 3.5–5.1)
SODIUM SERPL-SCNC: 139 MMOL/L (ref 136–145)
UFH PPP CHRO-ACNC: <0.1 IU/ML

## 2024-08-04 PROCEDURE — 99232 SBSQ HOSP IP/OBS MODERATE 35: CPT | Performed by: HOSPITALIST

## 2024-08-04 RX ORDER — METHOCARBAMOL 500 MG/1
1000 TABLET, FILM COATED ORAL EVERY 6 HOURS
Status: DISCONTINUED | OUTPATIENT
Start: 2024-08-04 | End: 2024-08-07

## 2024-08-04 RX ORDER — OXYCODONE AND ACETAMINOPHEN 10; 325 MG/1; MG/1
1 TABLET ORAL EVERY 4 HOURS PRN
Status: DISCONTINUED | OUTPATIENT
Start: 2024-08-04 | End: 2024-08-07

## 2024-08-04 RX ORDER — DEXAMETHASONE SODIUM PHOSPHATE 4 MG/ML
6 VIAL (ML) INJECTION ONCE
Status: COMPLETED | OUTPATIENT
Start: 2024-08-04 | End: 2024-08-04

## 2024-08-04 RX ORDER — ENOXAPARIN SODIUM 100 MG/ML
80 INJECTION SUBCUTANEOUS 2 TIMES DAILY
Status: DISCONTINUED | OUTPATIENT
Start: 2024-08-04 | End: 2024-08-06

## 2024-08-04 RX ORDER — GABAPENTIN 400 MG/1
400 CAPSULE ORAL 3 TIMES DAILY
Status: DISCONTINUED | OUTPATIENT
Start: 2024-08-04 | End: 2024-08-07

## 2024-08-04 RX ORDER — OXYCODONE AND ACETAMINOPHEN 10; 325 MG/1; MG/1
2 TABLET ORAL EVERY 4 HOURS PRN
Status: DISCONTINUED | OUTPATIENT
Start: 2024-08-04 | End: 2024-08-07

## 2024-08-04 NOTE — CONSULTS
Pharmacy Progress Note:  Anticoagulation Dose Adjustment     Be Beuno is a 28 year old female on enoxaparin for VTE prophylaxis.  Anti-factor Xa levels are being monitored due to the patient's high risk status of obesity.    Relevant labs:    Body mass index is 46.96 kg/m².    Wt Readings from Last 1 Encounters:   07/31/24 136 kg (299 lb 13.2 oz)       Lab Results   Component Value Date    CREATSERUM 0.70 08/04/2024       Heparin Anti Xa Assay   Date Value Ref Range Status   08/04/2024 <0.10 IU/mL Final       Anti Xa level being assessed:  <0.10 international units /ml   (Peak drawn 4 hours after dose).  Goal Peak Anti-Xa level:  (Enoxaparin prophylaxis: 0.2-0.4 unit/mL).      Based on the above, enoxaparin dose will be adjusted to 80 mg, to begin tonight . Next Anti-factor Xa will be ordered on 8/6.     Thank you,  Ling Storey, PharmD  8/4/2024 4:00 PM

## 2024-08-04 NOTE — PROGRESS NOTES
INPATIENT PROGRESS NOTE    Assessment:   Be Bueno in room 374/374-A, is a 28 year old female, Hospital Day ( LOS: 3 days ) hospitalized for Intractable back pain.    Post-Op Day 2 Days Post-Op s/p Procedure(s):  RIGHT LUMBAR 4-LUMBAR 5, LUMBAR 5-SACRAL 1 MICRODISCECTOMY    The patient's other hospital problems include:   Active Hospital Problems    *Intractable back pain        Plan:     Continue flat head of bed given dural leak, will reassess tomorrow  Hold PT OT at this time  DVT prophylaxis, SCDs, teds and Lovenox  Medical management per hospitalist  Continue to monitor for significant headaches that appear positional.  Continue to monitor for drainage at the incision site.  Notify neurosurgery of any concerns  Pain management as ordered  Discussed with Dr. Byrne    The patient was seen and examined with Dr. Byrne.  I am acting as scribe.  Patient agreed to the plan, verbalized understanding was very appreciative.  Discussed with nursing.    Subjective:   The patient endorses continued back and lower extremity pain.  Minimally improved from yesterday.  She is eager to sit up.  No significant headaches at this time.  No new neurologic complaints.    Objective:   I&O: I/O last 3 completed shifts:  In: -   Out: 4050 [Urine:4050]     VITALS: /67 (BP Location: Left arm)   Pulse 70   Temp 97.9 °F (36.6 °C) (Oral)   Resp 21   Wt 299 lb 13.2 oz (136 kg)   LMP 2024   SpO2 95%   BMI 46.96 kg/m²  Temp (24hrs), Av.1 °F (36.7 °C), Min:97.9 °F (36.6 °C), Max:98.5 °F (36.9 °C)     Incision: Dressing clean, dry and intact without edema, erythema or discharge noted   DVT Evaluation:  SCDs on     Clinical exam:  The patient is awake, alert and orientated.  Speech fluent.  Comprehension intact.  Answers questions appropriately.  Easily follows commands.  She is lying comfortably in bed.  Breathing easy and even.  Skin warm and dry.  Moving bilateral upper and lower extremities spontaneously  to full resistance    Imaging reviewed: No recent imaging    Daphnie Bo M.S., PAOsmanC  [facility]  [address], [address] [place] [address]  [phone]  8/4/2024 8:30 AM    Dragon speech recognition software was used to prepare this note. If a word or phrase is confusing, it is likely due to a failure of recognition. Please contact me with any questions or clarifications.    Is this a shared or split note between Advanced Practice Provider and Physician? Yes

## 2024-08-04 NOTE — PHYSICAL THERAPY NOTE
PT order received, chart reviewed and spoke directly with Dr. Byrne. Dr. Byrne recommends pt to remain on bedrest until 8/5/24 in the afternoon, and only then to sit up at EOB. Then OOB activity on 8/6/24, as long as pt is appropriate. Asked Dr. Byrne to please put specifics in documentation, so we know exactly when to work with pt. Dr. Byrne in agreement. RN notified. CM

## 2024-08-04 NOTE — PROGRESS NOTES
[facility]   part of [facility]     Hospitalist Progress Note     Be Lynda Bueno Patient Status:  Observation    1995 MRN [id number]   Location [facility] 3SW-A Attending Rhiannon Lemus,    Hosp Day # 3 PCP Leonel West MD     Chief Complaint: Flank pain    Subjective:     Patient seen and examined. Back pain persists. Leg movement improving.     Objective:    Review of Systems:   A comprehensive review of systems was completed; pertinent positive and negatives stated in subjective.    Vital signs:  Temp:  [97.9 °F (36.6 °C)-98.5 °F (36.9 °C)] 98.1 °F (36.7 °C)  Pulse:  [66-91] 77  Resp:  [13-21] 19  BP: (117-138)/(58-74) 130/62  SpO2:  [94 %-100 %] 96 %    Physical Exam:    General: No acute distress  Respiratory: No wheezes, no rhonchi  Cardiovascular: S1, S2, regular rate and rhythm  Abdomen: Soft, Non-tender, non-distended, positive bowel sounds  Neuro: No new focal deficits.   Extremities: No edema      Diagnostic Data:    Labs:  Recent Labs   Lab 24   WBC 7.9  --    HGB 12.1 10.6*   MCV 72.4*  --    .0  --        Recent Labs   Lab 24  0605   * 120* 109*   BUN 6* 13 15   CREATSERUM 0.76 0.70 0.70   CA 9.4 9.0 9.1   ALB 4.2  --   --     139 139   K 4.0 4.3 4.2    109 108   CO2 25.0 26.0 27.0   ALKPHO 81  --   --    AST 34*  --   --    ALT 46  --   --    BILT <0.2*  --   --    TP 7.1  --   --        Estimated Creatinine Clearance: 116.4 mL/min (based on SCr of 0.7 mg/dL).    No results for input(s): \"TROP\", \"TROPHS\", \"CK\" in the last 168 hours.    No results for input(s): \"PTP\", \"INR\" in the last 168 hours.               Microbiology    No results found for this visit on 24.      Imaging: Reviewed in Epic.    Medications:    methocarbamol  1,000 mg Oral q6h    gabapentin  400 mg Oral TID    enoxaparin  0.5 mg/kg Subcutaneous BID    ketorolac  30 mg Intravenous q6h    sennosides  17.2  mg Oral Nightly    docusate sodium  100 mg Oral BID    buPROPion SR  150 mg Oral BID       Assessment & Plan:      #Right lower extremity radiculopathy  #Severe L4-5 and moderate to severe L3-4 and L5-S1 canal stenosis secondary to large disc herniations (seen on MRI on 6/17)  -S/p right L4-L5 and L5-S1 microdiscectomy 8/2  -Neurosurgery following  -PT/OT when able  -Pain control     #Morbid obesity  -Body mass index is 46.96 kg/m².    Shiraz Hidalgo,     Supplementary Documentation:     Quality:  DVT Mechanical Prophylaxis: DAMARIS hose, SCDs, Early ambuation  DVT Pharmacologic Prophylaxis   Medication    enoxaparin (Lovenox) 80 MG/0.8ML SUBQ injection 70 mg                Code Status: Not on file  Rodriguez: Rodriguez catheter in place  Rodriguez Duration (in days):   Central line:    MAYTE:     Discharge is dependent on: post op recovery  At this point Ms. Bueno is expected to be discharge to: tbd    The 21st Century Cures Act makes medical notes like these available to patients in the interest of transparency. Please be advised this is a medical document. Medical documents are intended to carry relevant information, facts as evident, and the clinical opinion of the practitioner. The medical note is intended as peer to peer communication and may appear blunt or direct. It is written in medical language and may contain abbreviations or verbiage that are unfamiliar.

## 2024-08-04 NOTE — PLAN OF CARE
Alert and Oriented x4. On RA. VSS. Tele-NS. Severe Pain controlled by PRN medications, see MAR for actions. C/O new numbness in Right Foot and Toe, worsening headache. Dr. Byrne notified, new orders for Decadron 6mg IV once. Voiding freely via melo catheter. Tolerating diet. Denies N/V. Call light within reach at this time.    Plan: Pain management, flat bedrest until 1630 8/4 *to be reviewed by Dr. Byrne today*, PT/OT to see when able

## 2024-08-05 LAB
ANION GAP SERPL CALC-SCNC: 4 MMOL/L (ref 0–18)
BUN BLD-MCNC: 17 MG/DL (ref 9–23)
CALCIUM BLD-MCNC: 9.3 MG/DL (ref 8.7–10.4)
CHLORIDE SERPL-SCNC: 106 MMOL/L (ref 98–112)
CO2 SERPL-SCNC: 29 MMOL/L (ref 21–32)
CREAT BLD-MCNC: 0.75 MG/DL
EGFRCR SERPLBLD CKD-EPI 2021: 111 ML/MIN/1.73M2 (ref 60–?)
GLUCOSE BLD-MCNC: 84 MG/DL (ref 70–99)
OSMOLALITY SERPL CALC.SUM OF ELEC: 289 MOSM/KG (ref 275–295)
POTASSIUM SERPL-SCNC: 3.8 MMOL/L (ref 3.5–5.1)
SODIUM SERPL-SCNC: 139 MMOL/L (ref 136–145)

## 2024-08-05 PROCEDURE — 99232 SBSQ HOSP IP/OBS MODERATE 35: CPT | Performed by: HOSPITALIST

## 2024-08-05 RX ORDER — POTASSIUM CHLORIDE 20 MEQ/1
40 TABLET, EXTENDED RELEASE ORAL ONCE
Status: COMPLETED | OUTPATIENT
Start: 2024-08-05 | End: 2024-08-05

## 2024-08-05 NOTE — PROGRESS NOTES
[facility]   part of [facility]     Hospitalist Progress Note     Be Bueno Patient Status:  Observation    1995 MRN [id number]   Location [facility] 3SW-A Attending Rhiannon Lemus,    Hosp Day # 4 PCP Leonel West MD     Chief Complaint: Flank pain    Subjective:     Patient seen and examined. Back pain waxes and wanes. Reports numbness to right lateral calf.     Objective:    Review of Systems:   A comprehensive review of systems was completed; pertinent positive and negatives stated in subjective.    Vital signs:  Temp:  [98.7 °F (37.1 °C)-98.9 °F (37.2 °C)] 98.7 °F (37.1 °C)  Pulse:  [65-81] 68  Resp:  [15-22] 15  BP: (108-149)/(57-86) 113/60  SpO2:  [94 %-97 %] 95 %    Physical Exam:    General: No acute distress. Morbidly obese.   Respiratory: No wheezes, no rhonchi  Cardiovascular: S1, S2, regular rate and rhythm  Abdomen: Soft, Non-tender, non-distended, positive bowel sounds  Neuro: No new focal deficits.   Extremities: No edema      Diagnostic Data:    Labs:  Recent Labs   Lab 24  0427   WBC 7.9  --    HGB 12.1 10.6*   MCV 72.4*  --    .0  --        Recent Labs   Lab 247 24  0605 24  0539   * 120* 109* 84   BUN 6* 13 15 17   CREATSERUM 0.76 0.70 0.70 0.75   CA 9.4 9.0 9.1 9.3   ALB 4.2  --   --   --     139 139 139   K 4.0 4.3 4.2 3.8    109 108 106   CO2 25.0 26.0 27.0 29.0   ALKPHO 81  --   --   --    AST 34*  --   --   --    ALT 46  --   --   --    BILT <0.2*  --   --   --    TP 7.1  --   --   --        Estimated Creatinine Clearance: 108.6 mL/min (based on SCr of 0.75 mg/dL).    No results for input(s): \"TROP\", \"TROPHS\", \"CK\" in the last 168 hours.    No results for input(s): \"PTP\", \"INR\" in the last 168 hours.               Microbiology    No results found for this visit on 24.      Imaging: Reviewed in Epic.    Medications:    methocarbamol  1,000 mg Oral q6h     gabapentin  400 mg Oral TID    enoxaparin  80 mg Subcutaneous BID    sennosides  17.2 mg Oral Nightly    docusate sodium  100 mg Oral BID    buPROPion SR  150 mg Oral BID       Assessment & Plan:      #Right lower extremity radiculopathy  #Severe L4-5 and moderate to severe L3-4 and L5-S1 canal stenosis secondary to large disc herniations (seen on MRI on 6/17)  -S/p right L4-L5 and L5-S1 microdiscectomy 8/2  -Neurosurgery following  -PT/OT when able  -Pain control     #Morbid obesity  -Body mass index is 46.96 kg/m².    Shiraz Hidalgo,     Supplementary Documentation:     Quality:  DVT Mechanical Prophylaxis: DAMARIS hose, SCDs, Early ambuation  DVT Pharmacologic Prophylaxis   Medication    enoxaparin (Lovenox) 80 MG/0.8ML SUBQ injection 80 mg                Code Status: Not on file  Rodriguez: Rodriguez catheter in place  Rodriguez Duration (in days):   Central line:    MAYTE:     Discharge is dependent on: post op recovery  At this point Ms. Bueno is expected to be discharge to: tbd    The 21st Century Cures Act makes medical notes like these available to patients in the interest of transparency. Please be advised this is a medical document. Medical documents are intended to carry relevant information, facts as evident, and the clinical opinion of the practitioner. The medical note is intended as peer to peer communication and may appear blunt or direct. It is written in medical language and may contain abbreviations or verbiage that are unfamiliar.

## 2024-08-05 NOTE — PLAN OF CARE
Post-op day 2. Dressing is clean, dry, and intact. Flat bedrest ordered until 8/5 after assessment from surgery team. Bilateral SCD's. Oxygen via nasal cannula as needed. Room air. Continuous pulse oximeter. Incentive spirometer. Telemetry monitoring. Cardiac electrolyte protocol. Rodriguez care provided per protocol. Physical and occupational therapy evaluations pending. Regular diet. Plan is home when medically stable.     Per Neurosurgery note:     Attestation signed by CATHY Byrne DO at 8/4/2024 11:35 AM     Maintain flat til tomorrow at noon.     Then sit up to 15 degrees for lunch and 45 degrees for dinner.     OOBTC with PT Tuesday a.m.

## 2024-08-05 NOTE — PAYOR COMM NOTE
--------------  CONTINUED STAY REVIEW    Payor: [employer] OPEN ACCESS   Subscriber #:  [id number]  Authorization Number: [id number]    Admit date: 8/1/24  Admit ti    8/3      Date of surgery  8/2/24      Preoperative dx  herniated disc at L4-5 and L5-S1 on R      Postoperative dx  same      Procedure [please list them in numbered format]   1. Posterior midline approach to lumbar spine from L4-S1 on R   2. (R side) laminectomies at levels L4-5 and L5-S1 , medial facetectomies at levels L4-5 and L5-S1, and foraminotomies at levels L4-5 and L5-S1   3. Use of microscope for decompression   4. R-sided [Micro]Diskectomy at L4-5 and L5-1   5. Use of radiographs for vertebral level localization  6. Primary repair of incidental durotomy at L4-5 on R                             Assessment & Plan:  #Right lower extremity radiculopathy  #Severe L4-5 and moderate to severe L3-4 and L5-S1 canal stenosis secondary to large disc herniations (seen on MRI on 6/17)  -S/p right L4-L5 and L5-S1 microdiscectomy 8/2  -Neurosurgery following  -PT/OT when able    8/4  NEUROSURG    Continue flat head of bed given dural leak, will reassess tomorrow  Hold PT OT at this time  DVT prophylaxis, SCDs, teds and Lovenox  Medical management per hospitalist  Continue to monitor for significant headaches that appear positional.  Continue to monitor for drainage at the incision site.  Notify neurosurgery of any concerns                             Date Action Dose Route User    8/5/2024 0831 Given 150 mg Oral Shobha Rodriguez RN          diazePAM (Valium) tab 5 mg       Date Action Dose Route User    8/4/2024 2359 Given 5 mg Oral Kelly Cobb RN          docusate sodium (Colace) cap 100 mg       Date Action Dose Route User    8/5/2024 0831 Given 100 mg Oral Shobha Rodriguez RN    8/4/2024 2029 Given 100 mg Oral Che Norton RN          enoxaparin (Lovenox) 80 MG/0.8ML SUBQ injection 80 mg       Date Action Dose Route User    8/5/2024 0832 Given 80 mg  Subcutaneous (Left Lower Abdomen) Shobha Rodriguez RN          gabapentin (Neurontin) cap 400 mg       Date Action Dose Route User    8/5/2024 0831 Given 400 mg Oral Shobha Rodriguez RN    8/4/2024 2029 Given 400 mg Oral Che Norton RN    8/4/2024 1511 Given 400 mg Oral Che Norton RN          ketorolac (Toradol) 15 MG/ML injection 30 mg       Date Action Dose Route User    8/5/2024 0413 Given 30 mg Intravenous Kelly Cobb RN    8/4/2024 2100 Given 30 mg Intravenous Che Norton RN    8/4/2024 1510 Given 30 mg Intravenous Che Norton RN          methocarbamol (Robaxin) tab 1,000 mg       Date Action Dose Route User    8/5/2024 0831 Given 1,000 mg Oral Shobha Rodriguez RN    8/5/2024 0413 Given 1,000 mg Oral Kelly Cobb RN    8/4/2024 2029 Given 1,000 mg Oral Che Norton RN    8/4/2024 1313 Given 1,000 mg Oral Che Norton RN          oxyCODONE-acetaminophen (Percocet)  MG per tab 1 tablet       Date Action Dose Route User    8/5/2024 0831 Given 1 tablet Oral Shobha Rodriguez RN    8/4/2024 2359 Given 1 tablet Oral Kelly Cobb RN    8/4/2024 1839 Given 1 tablet Oral Che Norton RN          potassium chloride (Klor-Con M20) tab 40 mEq       Date Action Dose Route User    8/5/2024 0831 Given 40 mEq Oral Shobha Rodriguez RN          sennosides (Senokot) tab 17.2 mg       Date Action Dose Route User    8/4/2024 2029 Given 17.2 mg Oral Che Norton RN            Vitals (last day)       Date/Time Temp Pulse Resp BP SpO2 Weight O2 Device O2 Flow Rate (L/min) Who    08/05/24 0748 98.7 °F (37.1 °C) 68 15 113/60 95 % -- None (Room air) --     08/05/24 0400 98.9 °F (37.2 °C) 65 16 108/61 95 % -- None (Room air) 0 L/min CM    08/04/24 2345 98.8 °F (37.1 °C) 79 22 111/57 96 % -- None (Room air) 0 L/min CM    08/04/24 2054 98.7 °F (37.1 °C) 81 19 114/59 94 % -- None (Room air) 0 L/min CM    08/04/24 1629 98.9 °F (37.2 °C) -- -- 133/73 97 % -- None (Room air) -- LW    08/04/24 1221 98.8 °F (37.1 °C) 81 18  149/86 95 % -- None (Room air) --     08/04/24 0830 98.1 °F (36.7 °C) 77 19 130/62 96 % -- None (Room air) --     08/04/24 0623 -- 70 21 -- 95 % -- -- --     08/04/24 0334 97.9 °F (36.6 °C) 69 17 117/67 94 % -- None (Room air) --           CIWA Scores (since admission)       None

## 2024-08-05 NOTE — PHYSICAL THERAPY NOTE
PT order received. Chart reviewed. Pt s/p L4-S1 NETTA with Dr Byrne on 8/2 and remains on flat bedrest until today at 1200, will progress to 15 degrees at lunch and 45 degrees at dinner. Will continue to follow, per Dr. Byrne's note PT to see tomorrow Tuesday 8/6. RN aware.

## 2024-08-05 NOTE — OCCUPATIONAL THERAPY NOTE
OT orders received, pt chart reviewed. Pt s/p L4-S1 NETTA with Dr Byrne on 8/2 and remains on flat bedrest until today at 1200, will progress to 15 degrees at lunch and 45 degrees at dinner. Will continue to follow, per Dr. Byrne's note PT to see tomorrow Tuesday 8/6. RN aware.

## 2024-08-05 NOTE — PLAN OF CARE
Received pt at 2330. Pt is A&O x 4; follows commands, on bedrest until Monday at noon d/t to dural tear r/t microdiscectomy. Pt is on RA, VSS, reg diet. Pt is continent of bowel and bladder; bedpan & Rodriguez in place. Pt's pain is constant requesting prn meds along with scheduled roboxin & Toradol. IV access is patent currently SL.  NOC safety plan in place; bed in low position, call light and personal items within reach, pt encouraged to call staff for assistance.    POC:  Spine to see; Bedrest =>Monday  1200, then 15 deg for lunch, 45 deg for dinner

## 2024-08-05 NOTE — PLAN OF CARE
Pt A&Ox4. VSS on room air, IS encouraged 10x every hour. Telemetry monitoring - NSR. Incision to low back with gauze/tegaderm drsg C/D/I. Pt reports numbness and pain to RLE. Pain controlled with PRN medications. Tolerating general diet. Melo catheter in place. Last BM 8/4/24. Plan to elevate HOB this afternoon per orders. Fall precautions in place and pt reminded to \"call; don't fall.\" Plan to work with therapy tomorrow and discharge home when medically cleared. POC discussed with pt.     1950 - Spoke with tSeffi Glynn, received orders to remove melo catheter.

## 2024-08-06 LAB
POTASSIUM SERPL-SCNC: 3.8 MMOL/L (ref 3.5–5.1)
UFH PPP CHRO-ACNC: 0.54 IU/ML

## 2024-08-06 PROCEDURE — 99232 SBSQ HOSP IP/OBS MODERATE 35: CPT | Performed by: HOSPITALIST

## 2024-08-06 RX ORDER — POTASSIUM CHLORIDE 20 MEQ/1
40 TABLET, EXTENDED RELEASE ORAL ONCE
Status: COMPLETED | OUTPATIENT
Start: 2024-08-06 | End: 2024-08-06

## 2024-08-06 RX ORDER — ENOXAPARIN SODIUM 100 MG/ML
60 INJECTION SUBCUTANEOUS 2 TIMES DAILY
Status: DISCONTINUED | OUTPATIENT
Start: 2024-08-06 | End: 2024-08-07

## 2024-08-06 NOTE — PROGRESS NOTES
Pharmacy Progress Note:  Anticoagulation Dose Adjustment     Be Bueno is a 28 year old female on enoxaparin for VTE prophylaxis.  Anti-factor Xa levels are being monitored due to the patient's high risk status of obesity.    Relevant labs:    Body mass index is 46.96 kg/m².    Wt Readings from Last 1 Encounters:   07/31/24 136 kg (299 lb 13.2 oz)       Lab Results   Component Value Date    CREATSERUM 0.75 08/05/2024       Heparin Anti Xa Assay   Date Value Ref Range Status   08/06/2024 0.54 IU/mL Final       Anti Xa level being assessed:  0.54 units/mL (Peak drawn ~6.5 hours after dose).  Goal Peak Anti-Xa level:  (Enoxaparin prophylaxis: 0.2-0.4 unit/mL).  Goal Trough Anti-Xa level: </=0.5 unit/mL  (when applicable)    Based on the above, enoxaparin dose will be adjusted to 60 mg BID, to begin 8/6 @ 2100 . Next Anti-factor Xa will be ordered on 8/8.    Thank you,  Kathie Lilly, PharmD  8/6/2024 5:32 PM

## 2024-08-06 NOTE — PLAN OF CARE
Patient resting in bed with HOB up to 45 degrees . Denies any headache . Vital signs stable . Pain is controlled with robaxin valium and percocet . Pain is in the lower back and right leg , has some numbness to right leg. Dressing to back clean and dry . Voided  purewick in place . Patient had BM . Scd's on . Encouraged ankle pumps ,on lovenox . Safety precautions in place . Reminded to \"call don't fall\" . Plan for out of bed in am and PT/OT to see . Home when cleared .

## 2024-08-06 NOTE — PLAN OF CARE
Patient alert and oriented x4. VSS, on RA. Mild pain reported to back and radiating down RLE, pt stated overall improved. PO PRN and scheduled medications given. Pt up this AM with PT/OT, reported some dizziness and lingering nausea, zofran given. Pt voiding via purewick/brief/bed pan. Up in chair at this time. Telfa and tegaderm dressing to back surgical site, to be changed today per spine NP. SCD's in place.       Plan: pain management, cont PT/OT

## 2024-08-06 NOTE — PHYSICAL THERAPY NOTE
PHYSICAL THERAPY EVALUATION - INPATIENT     Room Number: 374/374-A  Evaluation Date: 8/6/2024  Type of Evaluation: Initial  Physician Order: PT Eval and Treat    Presenting Problem: s/p R L4-S1 NETTA 8/2/24  Co-Morbidities : depression  Reason for Therapy: Mobility Dysfunction and Discharge Planning    PHYSICAL THERAPY ASSESSMENT   Patient is currently functioning near baseline with bed mobility, transfers, and gait.  Prior to admission, patient's baseline is independent.  Patient is requiring minimal assist as a result of the following impairments: pain, impaired standing balance, decreased muscular endurance, and difficulty maintaining precautions. Physical Therapy will continue to follow for duration of hospitalization.      Patient will benefit from continued skilled PT Services at discharge to promote prior level of function and safety with additional support and return home with home health PT.    PLAN  PT Treatment Plan: Bed mobility;Endurance;Energy conservation;Patient education;Gait training;Balance training;Transfer training;Strengthening;Stoop training  Rehab Potential : Good  Frequency (Obs): Daily  Number of Visits to Meet Established Goals: 4      CURRENT GOALS    Goal #1 Patient is able to demonstrate supine - sit EOB @ level: supervision     Goal #2 Patient is able to demonstrate transfers EOB to/from BSC at assistance level: supervision     Goal #3 Patient is able to ambulate 150 feet with assist device: LRAD at assistance level: supervision     Goal #4 Pt will ascend/descend 1 stoop with supervision   Goal #5 Pt will demonstrate good understanding for spine precautions   Goal #6    Goal Comments: Goals established on 8/6/2024      PHYSICAL THERAPY MEDICAL/SOCIAL HISTORY  History related to current admission: Patient is a 28 year old female admitted on 7/31/2024 from home for back pain.  Pt diagnosed with R LE radiculopathy secondary to herniated disc at L4-5 and L5-S1. Pt s/p R L4-5, L5-S1 NETTA with  intra-op durotomy 24. Pt on bed rest post operatively. HOB elevated on 24 and ok for OOB 24.       HOME SITUATION  Type of Home: House   Home Layout: One level  Stairs to Enter : 1             Lives With: Significant other;Family (4 daughters ages 3-11)  Drives: Yes  Patient Owned Equipment: None       Prior Level of [place]: Pt lives with significant other and 4 children in single story home with 1 ROSANNE. Pt independent with ADL and mobility. Pt works as a  for [employer].     SUBJECTIVE  \"It feels like a rubber band is around my toe.\"       OBJECTIVE  Precautions: Spine  Fall Risk: High fall risk    WEIGHT BEARING RESTRICTION  Weight Bearing Restriction: None                PAIN ASSESSMENT  Ratin  Location: R hip, R foot  Management Techniques: Activity promotion;Repositioning    COGNITION  Overall Cognitive Status:  WFL - within functional limits    RANGE OF MOTION AND STRENGTH ASSESSMENT  Upper extremity ROM and strength are within functional limits     Lower extremity ROM is within functional limits     Lower extremity strength is within functional limits       BALANCE  Static Sitting: Good  Dynamic Sitting: Good  Static Standing: Poor +  Dynamic Standing: Poor +    ADDITIONAL TESTS                                    ACTIVITY TOLERANCE         BP: 126/80                O2 WALK       NEUROLOGICAL FINDINGS                        AM-PAC '6-Clicks' INPATIENT SHORT FORM - BASIC MOBILITY  How much difficulty does the patient currently have...  Patient Difficulty: Turning over in bed (including adjusting bedclothes, sheets and blankets)?: A Little   Patient Difficulty: Sitting down on and standing up from a chair with arms (e.g., wheelchair, bedside commode, etc.): A Little   Patient Difficulty: Moving from lying on back to sitting on the side of the bed?: A Little   How much help from another person does the patient currently need...   Help from Another: Moving to and from a bed  to a chair (including a wheelchair)?: A Little   Help from Another: Need to walk in hospital room?: A Little   Help from Another: Climbing 3-5 steps with a railing?: A Lot       AM-PAC Score:  Raw Score: 17   Approx Degree of Impairment: 50.57%   Standardized Score (AM-PAC Scale): 42.13   CMS Modifier (G-Code): CK    FUNCTIONAL ABILITY STATUS  Gait Assessment   Functional Mobility/Gait Assessment  Gait Assistance: Minimum assistance  Distance (ft): 6  Assistive Device: Rolling walker    Skilled Therapy Provided   Supervision for bed mobility.   VC for log roll.  VC for safe transfer set up.   Requires a few attempts and elevated bed height due to R hip pain.   CGA for sit-stand to RW with high bed height.   VC for posture and sequencing.   CGA initially for ambulating a few steps with RW.   Keeping eyes closed and reports dizziness.   LOB requiring MIN assist for balance recovery.   Returned to bedside chair.   VC for sequencing with RW.     Bed Mobility:  Rolling: supervision  Supine to sit: supervision   Sit to supine: NT     Transfer Mobility:  Sit to stand: CGA   Stand to sit: CGA  Gait = MIN     Therapist's Comments:  Reviewed spine precautions and log roll for bed mobility. Discussed activity recommendations.     Exercise/Education Provided:  Bed mobility  Energy conservation  Functional activity tolerated  Gait training  Posture  Strengthening  Transfer training    Patient End of Session: Up in chair;Needs met;Call light within reach;RN aware of session/findings;All patient questions and concerns addressed;SCDs in place;Ice applied;Alarm set;Discussed recommendations with /      Patient Evaluation Complexity Level:  History Moderate - 1 or 2 personal factors and/or co-morbidities   Examination of body systems Low -  addressing 1-2 elements   Clinical Presentation Low- Stable   Clinical Decision Making Low Complexity       PT Session Time: 25 minutes  Gait Training:   minutes  Therapeutic Activity: 10 minutes  Neuromuscular Re-education:  minutes  Therapeutic Exercise:  minutes

## 2024-08-06 NOTE — PROGRESS NOTES
[facility]   part of [facility]     Hospitalist Progress Note     Be Bueno Patient Status:  Observation    1995 MRN [id number]   Location [facility] 3SW-A Attending Rhiannon Lemus,    Hosp Day # 5 PCP Leonel West MD     Chief Complaint: Flank pain    Subjective:     Patient seen and examined. C/o nausea. Back pain stable.     Objective:    Review of Systems:   A comprehensive review of systems was completed; pertinent positive and negatives stated in subjective.    Vital signs:  Temp:  [97.4 °F (36.3 °C)-98.5 °F (36.9 °C)] 98.2 °F (36.8 °C)  Pulse:  [69-92] 90  Resp:  [16-29] 18  BP: (111-138)/(47-80) 126/80  SpO2:  [95 %-99 %] 98 %    Physical Exam:    General: No acute distress. Morbidly obese.   Respiratory: No wheezes, no rhonchi  Cardiovascular: S1, S2, regular rate and rhythm  Abdomen: Soft, Non-tender, non-distended, positive bowel sounds  Neuro: No new focal deficits.   Extremities: No edema      Diagnostic Data:    Labs:  Recent Labs   Lab 247   WBC 7.9  --    HGB 12.1 10.6*   MCV 72.4*  --    .0  --        Recent Labs   Lab 24  0427 24  0605 24  0539 24  0532   * 120* 109* 84  --    BUN 6* 13 15 17  --    CREATSERUM 0.76 0.70 0.70 0.75  --    CA 9.4 9.0 9.1 9.3  --    ALB 4.2  --   --   --   --     139 139 139  --    K 4.0 4.3 4.2 3.8 3.8    109 108 106  --    CO2 25.0 26.0 27.0 29.0  --    ALKPHO 81  --   --   --   --    AST 34*  --   --   --   --    ALT 46  --   --   --   --    BILT <0.2*  --   --   --   --    TP 7.1  --   --   --   --        Estimated Creatinine Clearance: 108.6 mL/min (based on SCr of 0.75 mg/dL).    No results for input(s): \"TROP\", \"TROPHS\", \"CK\" in the last 168 hours.    No results for input(s): \"PTP\", \"INR\" in the last 168 hours.               Microbiology    No results found for this visit on 24.      Imaging: Reviewed in Epic.    Medications:     methocarbamol  1,000 mg Oral q6h    gabapentin  400 mg Oral TID    enoxaparin  80 mg Subcutaneous BID    sennosides  17.2 mg Oral Nightly    docusate sodium  100 mg Oral BID    buPROPion SR  150 mg Oral BID       Assessment & Plan:      #Right lower extremity radiculopathy  #Severe L4-5 and moderate to severe L3-4 and L5-S1 canal stenosis secondary to large disc herniations (seen on MRI on 6/17)  -S/p right L4-L5 and L5-S1 microdiscectomy 8/2  -Neurosurgery following  -PT/OT  -Pain control     #Nausea  -PRN anti-emetics     #Morbid obesity  -Body mass index is 46.96 kg/m².    Shiraz Hidalgo,     Supplementary Documentation:     Quality:  DVT Mechanical Prophylaxis: DAMARIS hose, SCDs, Early ambuation  DVT Pharmacologic Prophylaxis   Medication    enoxaparin (Lovenox) 80 MG/0.8ML SUBQ injection 80 mg                Code Status: Not on file  Rodriguez: External urinary catheter in place  Rodriguez Duration (in days):   Central line:    MAYTE:     Discharge is dependent on: post op recovery  At this point Ms. Bueno is expected to be discharge to: tbd    The 21st Century Cures Act makes medical notes like these available to patients in the interest of transparency. Please be advised this is a medical document. Medical documents are intended to carry relevant information, facts as evident, and the clinical opinion of the practitioner. The medical note is intended as peer to peer communication and may appear blunt or direct. It is written in medical language and may contain abbreviations or verbiage that are unfamiliar.

## 2024-08-06 NOTE — OCCUPATIONAL THERAPY NOTE
OCCUPATIONAL THERAPY ORTHO EVALUATION - INPATIENT     Room Number: 374/374-A  Evaluation Date: 8/6/2024  Type of Evaluation: Initial  Presenting Problem: s/p R L4-S1 NETTA with incidental durotomy s/p patch on 8/2    Physician Order: IP Consult to Occupational Therapy  Reason for Therapy: ADL/IADL Dysfunction and Discharge Planning    OCCUPATIONAL THERAPY ASSESSMENT   Patient is currently functioning below baseline with toileting, bathing, upper body dressing, grooming, bed mobility, and transfers. Prior to admission, patient's baseline is independent.  Patient is requiring moderate assistance as a result of the following impairments: decreased functional strength, decreased functional reach, decreased endurance, pain, and impaired standing balance. Occupational Therapy will continue to follow for duration of hospitalization.    Patient will benefit from continued skilled OT Services at discharge to promote prior level of function.  Anticipate patient will return home with home health OT      History Related to Current Admission: Patient is a 28 year old female admitted on 7/31/2024 with Presenting Problem: s/p R L4-S1 NETTA with incidental durotomy s/p patch on 8/2. Pt has been on flat bedrest for days and HOB elevated last night>cleared for OOB activity today.       Co-Morbidities : depression    Recommendations for nursing staff:   Transfers: mod A (min Ax2)   Toileting location: bedside commode    EVALUATION SESSION    Patient Start of Session: Pt was found in her bed.     FUNCTIONAL TRANSFER ASSESSMENT  Sit to Stand: Edge of Bed  Edge of Bed: Moderate Assist (min Ax2)    BED MOBILITY  Supine to Sit : Supervision    BALANCE ASSESSMENT     FUNCTIONAL ADL ASSESSMENT  LB Dressing Seated: Maximum Assist      ACTIVITY TOLERANCE:                          O2 SATURATIONS       Cognition  WFL    Upper extremity  WFL    EDUCATION PROVIDED  Patient: Role of Occupational Therapy; Plan of Care; Discharge Recommendations;  Functional Transfer Techniques; Fall Prevention; Surgical Precautions; Compensatory ADL Techniques; Proper Body Mechanics; Posture/Positioning  Patient's Response to Education: Verbalized Understanding      Equipment used: RW, gait belt   Demonstrates functional use, Would benefit from additional trial     Therapist comments: LB dressing to don non-skid socks while in bed>pt educated on spine precautions>supine>sit EOB via log rolling>stand to RW>steps to R side to sit in chair, reclined     Patient End of Session: Up in chair;Needs met;Call light within reach;RN aware of session/findings;All patient questions and concerns addressed;SCDs in place;Ice applied;Alarm set    OCCUPATIONAL PROFILE    HOME SITUATION  Type of Home: House  Home Layout: One level  Lives With: Significant other;Family (4 daughters ages 3-11)    Toilet and Equipment: Standard height toilet  Shower/Tub and Equipment: Tub-shower combo  Other Equipment: None    Occupation/Status:  at [employer]  Hand Dominance: Right  Drives: Yes       Prior Level of Function: Pt stated she lives with her four daughters (ages 11, 8, 6 and 2) and their father. Pt is typically independent. Pt reports that her significant other and her mother could assist as needed. 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       SUBJECTIVE   \"I didn't think it would be this hard.\"     PAIN ASSESSMENT  Rating: Unable to rate  Location: back abd R hip  Management Techniques: Activity promotion;Repositioning;Relaxation;Ice    OBJECTIVE  Precautions: Spine  Fall Risk: High fall risk    WEIGHT BEARING RESTRICTION  Weight Bearing Restriction: None                ASSESSMENTS    AM-PAC '6-Clicks' Inpatient Daily Activity Short Form  -   Putting on and taking off regular lower body clothing?: A Lot  -   Bathing (including washing, rinsing, drying)?: A Lot  -   Toileting, which includes using toilet, bedpan or urinal? : A Lot  -   Putting on and taking off regular upper body clothing?: A  Little  -   Taking care of personal grooming such as brushing teeth?: None  -   Eating meals?: None    AM-PAC Score:  Score: 17  Approx Degree of Impairment: 50.11%  Standardized Score (AM-PAC Scale): 37.26    PLAN  OT Treatment Plan: Balance activities;Energy conservation/work simplification techniques;ADL training;Functional transfer training;Endurance training;Patient/Family education;Patient/Family training;Compensatory technique education;Continued evaluation  Rehab Potential : Good  Frequency: Daily  Number of Visits to Meet Established Goals: 5    ADL Goals  Patient will perform toileting with supervision and AE PRN  Patient will perform LB dressing with supervision and AE PRN    Functional Transfer Goals  Patient will perform bed mobility supine to sit with supervision  Patient will perform bed mobility sit to supine with supervision  Patient will perform toilet transfer with supervision    Additional Goals:  Patient will state spine precautions and maintain during ADL      Patient Evaluation Complexity Level:   Occupational Profile/Medical History MODERATE - Expanded review of history including review of medical or therapy record   Specific performance deficits impacting engagement in ADL/IADL MODERATE  3 - 5 performance deficits   Client Assessment/Performance Deficits MODERATE - Comorbidities and min to mod modifications of tasks    Clinical Decision Making MODERATE - Analysis of occupational profile, detailed assessments, several treatment options    Overall Complexity MODERATE     OT Session Time: 30 minutes  Therapeutic Activity: 15 minutes

## 2024-08-06 NOTE — PROGRESS NOTES
[facility]  Neurosurgery Progress Note    Be Bueno Patient Status:  Inpatient    1995 MRN [id number]   Location [facility] 3SW-A Attending Shiraz Hidalgo,    Hosp Day # 5 PCP Leonel West MD     PRINCIPLE PROBLEM:   S/p right L4-5, L5-S1 NETTA POD #4    SUBJECTIVE     Pt examined, reports some radiating pain in the right thigh. Tolerated gradual elevation of HOB yesterday without complaints of headaches or incisional drainage. She has no new complaints otherwise.     OBJECTIVE/PHYSICAL EXAM     VITALS:  /54 (BP Location: Left arm)   Pulse 79   Temp 98.5 °F (36.9 °C) (Oral)   Resp 17   Wt 299 lb 13.2 oz (136 kg)   LMP 2024   SpO2 97%   BMI 46.96 kg/m²     GENERAL: No acute distress, non-toxic appearing, mood appropriate    HEENT: Normocephalic, atraumatic    RESP:  Respirations non-labored, even    CV:  NSR on tele    NEURO: Alert and oriented x3.  Sensation to light touch is intact bilaterally.  RICO x 4. Gait deferred.     SKIN: Surgical dressing C/D/I without drainage, erythema, edema    LABS     Lab Results   Component Value Date    K 3.8 2024     IMAGING     No new imaging to review.    ASSESSMENT & PLAN     ASSESSMENT:  S/p right L4-5, L5-S1 NETTA POD #4    PLAN:  OOB to chair today  PT/OT  Pain medications as ordered  Medical management per hospitalist  DVT prophylaxis - SCDs, TEDs    Plan was discussed with Dr. Byrne.    Steffi Glynn, APRN-NP  [facility]  2024

## 2024-08-06 NOTE — CM/SW NOTE
Informed by PT that pt would benefit from [facility] services at ID.  Referrals sent via [employer] to determine any agencies able to see pt in [facility] and in network with her [facility] insurance plan.  Await responses.  / to remain available for support and/or discharge planning.     Aundrea Dixon, [facility]  Discharge Planner  [phone]

## 2024-08-07 VITALS
DIASTOLIC BLOOD PRESSURE: 54 MMHG | WEIGHT: 293 LBS | BODY MASS INDEX: 47 KG/M2 | RESPIRATION RATE: 17 BRPM | TEMPERATURE: 98 F | SYSTOLIC BLOOD PRESSURE: 117 MMHG | OXYGEN SATURATION: 92 % | HEART RATE: 78 BPM

## 2024-08-07 LAB — POTASSIUM SERPL-SCNC: 3.8 MMOL/L (ref 3.5–5.1)

## 2024-08-07 PROCEDURE — 99239 HOSP IP/OBS DSCHRG MGMT >30: CPT | Performed by: HOSPITALIST

## 2024-08-07 RX ORDER — HYDROCODONE BITARTRATE AND ACETAMINOPHEN 5; 325 MG/1; MG/1
1-2 TABLET ORAL EVERY 4 HOURS PRN
Qty: 60 TABLET | Refills: 0 | Status: SHIPPED | OUTPATIENT
Start: 2024-08-07

## 2024-08-07 RX ORDER — GABAPENTIN 400 MG/1
400 CAPSULE ORAL 3 TIMES DAILY
Qty: 90 CAPSULE | Refills: 0 | Status: SHIPPED | OUTPATIENT
Start: 2024-08-07 | End: 2024-09-06

## 2024-08-07 RX ORDER — METHOCARBAMOL 1000 MG/1
500 TABLET, COATED ORAL EVERY 6 HOURS
Qty: 30 TABLET | Refills: 0 | Status: SHIPPED | OUTPATIENT
Start: 2024-08-07 | End: 2024-08-08 | Stop reason: DRUGHIGH

## 2024-08-07 RX ORDER — PSEUDOEPHEDRINE HCL 30 MG
100 TABLET ORAL 2 TIMES DAILY
Qty: 30 CAPSULE | Refills: 0 | Status: SHIPPED | OUTPATIENT
Start: 2024-08-07

## 2024-08-07 RX ORDER — HYDROCODONE BITARTRATE AND ACETAMINOPHEN 5; 325 MG/1; MG/1
1 TABLET ORAL EVERY 4 HOURS PRN
Status: DISCONTINUED | OUTPATIENT
Start: 2024-08-07 | End: 2024-08-07

## 2024-08-07 RX ORDER — DIAZEPAM 5 MG/1
5 TABLET ORAL EVERY 6 HOURS PRN
Qty: 30 TABLET | Refills: 0 | Status: SHIPPED | OUTPATIENT
Start: 2024-08-07

## 2024-08-07 RX ORDER — HYDROCODONE BITARTRATE AND ACETAMINOPHEN 5; 325 MG/1; MG/1
2 TABLET ORAL EVERY 4 HOURS PRN
Status: DISCONTINUED | OUTPATIENT
Start: 2024-08-07 | End: 2024-08-07

## 2024-08-07 RX ORDER — ONDANSETRON 4 MG/1
4 TABLET, ORALLY DISINTEGRATING ORAL EVERY 6 HOURS PRN
Qty: 30 TABLET | Refills: 0 | Status: SHIPPED | OUTPATIENT
Start: 2024-08-07

## 2024-08-07 NOTE — PAYOR COMM NOTE
--------------  CONTINUED STAY REVIEW  8/5-8/6  Payor: [employer] OPEN ACCESS   Subscriber #:  [id number]  Authorization Number: [id number]    Admit date: 8/1/24  Admit time: 10:01 AM    REVIEW DOCUMENTATION:    8/5      Chief Complaint: Flank pain        Subjective:  Patient seen and examined. Back pain waxes and wanes. Reports numbness to right lateral calf.            Objective:  Review of Systems:   A comprehensive review of systems was completed; pertinent positive and negatives stated in subjective.     Vital signs:  Temp:  [98.7 °F (37.1 °C)-98.9 °F (37.2 °C)] 98.7 °F (37.1 °C)  Pulse:  [65-81] 68  Resp:  [15-22] 15  BP: (108-149)/(57-86) 113/60  SpO2:  [94 %-97 %] 95 %     Physical Exam:    General: No acute distress. Morbidly obese.   Respiratory: No wheezes, no rhonchi  Cardiovascular: S1, S2, regular rate and rhythm  Abdomen: Soft, Non-tender, non-distended, positive bowel sounds  Neuro: No new focal deficits.   Extremities: No edema        Diagnostic Data:    Labs:       Recent Labs   Lab 07/31/24 0257 08/03/24 0427   WBC 7.9  --    HGB 12.1 10.6*   MCV 72.4*  --    .0  --                 Recent Labs   Lab 07/31/24 0257 08/03/24 0427 08/04/24  0605 08/05/24  0539   * 120* 109* 84   BUN 6* 13 15 17   CREATSERUM 0.76 0.70 0.70 0.75   CA 9.4 9.0 9.1 9.3   ALB 4.2  --   --   --     139 139 139   K 4.0 4.3 4.2 3.8    109 108 106   CO2 25.0 26.0 27.0 29.0   ALKPHO 81  --   --   --    AST 34*  --   --   --    ALT 46  --   --   --    BILT <0.2*  --   --   --    TP 7.1  --   --   --        Scheduled Medications    methocarbamol  1,000 mg Oral q6h    gabapentin  400 mg Oral TID    enoxaparin  80 mg Subcutaneous BID    sennosides  17.2 mg Oral Nightly    docusate sodium  100 mg Oral BID    buPROPion SR  150 mg Oral BID                  Assessment & Plan:  #Right lower extremity radiculopathy  #Severe L4-5 and moderate to severe L3-4 and L5-S1 canal stenosis secondary to large disc  herniations (seen on MRI on 6/17)  -S/p right L4-L5 and L5-S1 microdiscectomy 8/2  -Neurosurgery following  -PT/OT when able  -Pain control      #Morbid obesity  -Body mass index is 46.96 kg/m².            8/5 Neurosurgery    PRINCIPLE PROBLEM:   S/p right L4-5, L5-S1 NETTA POD #3     SUBJECTIVE      Pt examined, reports some radiating pain in the right thigh, otherwise has no new complaints.   Remains on flat bedrest.      OBJECTIVE/PHYSICAL EXAM      VITALS:  /60 (BP Location: Left arm)   Pulse 68   Temp 98.7 °F (37.1 °C) (Oral)   Resp 15   Wt 299 lb 13.2 oz (136 kg)   LMP 07/01/2024   SpO2 95%   BMI 46.96 kg/m²      GENERAL: No acute distress, non-toxic appearing, mood appropriate     HEENT: Normocephalic, atraumatic     RESP:  Respirations non-labored, even     CV:  NSR on tele     NEURO: Alert and oriented x3.  Sensation to light touch is intact bilaterally.  RICO x 4. Gait deferred.      SKIN: Surgical dressing C/D/I without drainage, erythema, edema     LABS            Lab Results   Component Value Date     CREATSERUM 0.75 08/05/2024     BUN 17 08/05/2024      08/05/2024     K 3.8 08/05/2024      08/05/2024     CO2 29.0 08/05/2024     GLU 84 08/05/2024     CA 9.3 08/05/2024      IMAGING      No new imaging to review.     ASSESSMENT & PLAN      ASSESSMENT:  S/p right L4-5, L5-S1 NETTA POD #3     PLAN:  Increase HOB to 15 degrees this afternoon, 45 degrees this evening  If tolerates the above, OOB to chair tomorrow  Pain medications as ordered  Medical management per hospitalist  DVT prophylaxis - SCDs, TEDs        8/6 Neurosurgery    PRINCIPLE PROBLEM:   S/p right L4-5, L5-S1 NETTA POD #4     SUBJECTIVE      Pt examined, reports some radiating pain in the right thigh. Tolerated gradual elevation of HOB yesterday without complaints of headaches or incisional drainage. She has no new complaints otherwise.      OBJECTIVE/PHYSICAL EXAM      VITALS:  /54 (BP Location: Left arm)   Pulse 79    Temp 98.5 °F (36.9 °C) (Oral)   Resp 17   Wt 299 lb 13.2 oz (136 kg)   LMP 07/01/2024   SpO2 97%   BMI 46.96 kg/m²      GENERAL: No acute distress, non-toxic appearing, mood appropriate     HEENT: Normocephalic, atraumatic     RESP:  Respirations non-labored, even     CV:  NSR on tele     NEURO: Alert and oriented x3.  Sensation to light touch is intact bilaterally.  RICO x 4. Gait deferred.      SKIN: Surgical dressing C/D/I without drainage, erythema, edema     LABS            Lab Results   Component Value Date     K 3.8 08/06/2024      IMAGING      No new imaging to review.     ASSESSMENT & PLAN      ASSESSMENT:  S/p right L4-5, L5-S1 NETTA POD #4     PLAN:  OOB to chair today  PT/OT  Pain medications as ordered  Medical management per hospitalist  DVT prophylaxis - Gwen Mclaughlin        8/6 IM    Chief Complaint: Flank pain        Subjective:  Patient seen and examined. C/o nausea. Back pain stable.            Objective:  Review of Systems:   A comprehensive review of systems was completed; pertinent positive and negatives stated in subjective.     Vital signs:  Temp:  [97.4 °F (36.3 °C)-98.5 °F (36.9 °C)] 98.2 °F (36.8 °C)  Pulse:  [69-92] 90  Resp:  [16-29] 18  BP: (111-138)/(47-80) 126/80  SpO2:  [95 %-99 %] 98 %     Physical Exam:    General: No acute distress. Morbidly obese.   Respiratory: No wheezes, no rhonchi  Cardiovascular: S1, S2, regular rate and rhythm  Abdomen: Soft, Non-tender, non-distended, positive bowel sounds  Neuro: No new focal deficits.   Extremities: No edema        Diagnostic Data:    Labs:       Recent Labs   Lab 07/31/24 0257 08/03/24 0427   WBC 7.9  --    HGB 12.1 10.6*   MCV 72.4*  --    .0  --                  Recent Labs   Lab 07/31/24  0257 08/03/24  0427 08/04/24  0605 08/05/24  0539 08/06/24  0532   * 120* 109* 84  --    BUN 6* 13 15 17  --    CREATSERUM 0.76 0.70 0.70 0.75  --    CA 9.4 9.0 9.1 9.3  --    ALB 4.2  --   --   --   --     139 139 139  --     K 4.0 4.3 4.2 3.8 3.8    109 108 106  --    CO2 25.0 26.0 27.0 29.0  --    ALKPHO 81  --   --   --   --    AST 34*  --   --   --   --    ALT 46  --   --   --   --    BILT <0.2*  --   --   --   --    TP 7.1  --   --   --   --       Medications:   Scheduled Medications    methocarbamol  1,000 mg Oral q6h    gabapentin  400 mg Oral TID    enoxaparin  80 mg Subcutaneous BID    sennosides  17.2 mg Oral Nightly    docusate sodium  100 mg Oral BID    buPROPion SR  150 mg Oral BID                  Assessment & Plan:  #Right lower extremity radiculopathy  #Severe L4-5 and moderate to severe L3-4 and L5-S1 canal stenosis secondary to large disc herniations (seen on MRI on 6/17)  -S/p right L4-L5 and L5-S1 microdiscectomy 8/2  -Neurosurgery following  -PT/OT  -Pain control      #Nausea  -PRN anti-emetics      #Morbid obesity  -Body mass index is 46.96 kg/m².         MEDICATIONS ADMINISTERED IN LAST 1 DAY:  buPROPion SR (WELLBUTRIN SR) 12 hr tab 150 mg       Date Action Dose Route User    8/7/2024 0902 Given 150 mg Oral Kaitlynn Sewell RN          docusate sodium (Colace) cap 100 mg       Date Action Dose Route User    8/7/2024 0902 Given 100 mg Oral Kaitlynn Sewell RN    8/6/2024 2028 Given 100 mg Oral Priyank Cope RN          enoxaparin (Lovenox) 60 MG/0.6ML SUBQ injection 60 mg       Date Action Dose Route User    8/6/2024 2028 Given 60 mg Subcutaneous (Left Lower Abdomen) Priyank Cope RN          gabapentin (Neurontin) cap 400 mg       Date Action Dose Route User    8/7/2024 0902 Given 400 mg Oral Kaitlynn Sewell RN    8/6/2024 2028 Given 400 mg Oral Priyank Cope RN    8/6/2024 1630 Given 400 mg Oral Constanza Fernando RN          HYDROcodone-acetaminophen (Norco) 5-325 MG per tab 1 tablet       Date Action Dose Route User    8/7/2024 1350 Given 1 tablet Oral Kaitlynn Sewell RN    8/7/2024 1215 Given 1 tablet Oral Kaitlynn Sewell, RN          methocarbamol (Robaxin) tab 1,000 mg        Date Action Dose Route User    8/7/2024 0903 Given 1,000 mg Oral Kaitlynn Sewell RN    8/7/2024 0213 Given 1,000 mg Oral Ana Calderon RN    8/6/2024 2029 Given 1,000 mg Oral Priyank Cope RN    8/6/2024 1501 Given 1,000 mg Oral Constanza Fernando RN          ondansetron (Zofran) 4 MG/2ML injection 4 mg       Date Action Dose Route User    8/7/2024 0903 Given 4 mg Intravenous Kaitlynn Sewell RN          oxyCODONE-acetaminophen (Percocet)  MG per tab 1 tablet       Date Action Dose Route User    8/7/2024 0329 Given 1 tablet Oral Ana Calderon RN    8/6/2024 1630 Given 1 tablet Oral Constanza Fernando RN          oxyCODONE-acetaminophen (Percocet)  MG per tab 2 tablet       Date Action Dose Route User    8/7/2024 0652 Given 2 tablet Oral Ana Calderon RN    8/6/2024 2038 Given 2 tablet Oral Priyank Cope RN          sennosides (Senokot) tab 17.2 mg       Date Action Dose Route User    8/6/2024 2028 Given 17.2 mg Oral Priyank Cope RN            Vitals (last day)       Date/Time Temp Pulse Resp BP SpO2 Weight O2 Device O2 Flow Rate (L/min) [facility]    08/07/24 0740 98.1 °F (36.7 °C) 78 17 117/54 92 % -- None (Room air) -- NS    08/07/24 0444 98.3 °F (36.8 °C) 88 16 92/70 93 % -- None (Room air) 0 L/min KE    08/07/24 0300 -- 89 -- 92/70 92 % -- -- --     08/07/24 0200 -- 82 -- -- 92 % -- -- --     08/06/24 2319 98.1 °F (36.7 °C) 85 18 114/82 95 % -- None (Room air) 0 L/min     08/06/24 2017 98 °F (36.7 °C) 85 16 134/87 91 % -- None (Room air) 0 L/min KE    08/06/24 1745 98.4 °F (36.9 °C) 80 16 103/60 97 % -- None (Room air) -- DW    08/06/24 1045 98.2 °F (36.8 °C) 90 18 126/80 98 % -- None (Room air) -- DW    08/06/24 0745 98.5 °F (36.9 °C) 79 17 118/54 97 % -- None (Room air) -- DW    08/06/24 0430 98.4 °F (36.9 °C) 69 16 111/47 99 % -- None (Room air) -- BM    08/06/24 0000 98.4 °F (36.9 °C) 81 20 116/51 96 % -- None (Room air) --           CIWA Scores (since admission)        None

## 2024-08-07 NOTE — PHYSICAL THERAPY NOTE
PHYSICAL THERAPY TREATMENT NOTE - INPATIENT    Room Number: 374/374-A     Session: 1     Number of Visits to Meet Established Goals: 4    Presenting Problem: s/p R L4-S1 NETTA 8/2/24  Co-Morbidities : depression    PHYSICAL THERAPY MEDICAL/SOCIAL HISTORY  History related to current admission: Patient is a 28 year old female admitted on 7/31/2024 from home for back pain.  Pt diagnosed with R LE radiculopathy secondary to herniated disc at L4-5 and L5-S1. Pt s/p R L4-5, L5-S1 NETTA with intra-op durotomy 8/2/24. Pt on bed rest post operatively. HOB elevated on 8/5/24 and ok for OOB 8/6/24.         HOME SITUATION  Type of Home: House   Home Layout: One level  Stairs to Enter : 1     Lives With: Significant other;Family (4 daughters ages 3-11)  Drives: Yes  Patient Owned Equipment: None     Prior Level of [place]: Pt lives with significant other and 4 children in single story home with 1 ROSANNE. Pt independent with ADL and mobility. Pt works as a  for [employer].     ASSESSMENT     Pt issued RW 8/7/2024 and gait belt 8/7/2024     Pt requesting info for local [employer] closets - stated that [facility] in [place] is no longer active, and inquiring about a raised toilet seat or 3:1 commode.  SW made aware.     Patient is currently functioning near baseline with bed mobility, transfers, and gait.    Patient currently does not meet criteria for skilled inpatient physical therapy services, however patient will continue to benefit from QID ambulation with nursing staff.  Pt is discharged from IP PT services.  RN aware to re-order if there is a decline in mobility status.          PLAN  PT Treatment Plan: Bed mobility;Endurance;Energy conservation;Patient education;Gait training;Balance training;Transfer training;Strengthening;Stoop training  Rehab Potential : Good  Frequency (Obs): Daily    CURRENT GOALS     Goal #1 Patient is able to demonstrate supine - sit EOB @ level: supervision      Goal #2 Patient is  able to demonstrate transfers EOB to/from [facility] at assistance level: supervision      Goal #3 Patient is able to ambulate 150 feet with assist device: LRAD at assistance level: supervision      Goal #4 Pt will ascend/descend 1 stoop with supervision   Goal #5 Pt will demonstrate good understanding for spine precautions   Goal #6     Goal Comments: Goals established on 8/6/2024 8/7/2024 all goals  achieved     SUBJECTIVE  \"Yeah I am more confident today!\"    OBJECTIVE  Precautions: Spine    WEIGHT BEARING RESTRICTION  Weight Bearing Restriction: None                PAIN ASSESSMENT   Rating: Unable to rate  Location: R hip  Management Techniques: Activity promotion;Repositioning    BALANCE                                                                                                                       Static Sitting: Good  Dynamic Sitting: Good           Static Standing: Poor +  Dynamic Standing: Poor +    ACTIVITY TOLERANCE                         O2 WALK         AM-PAC '6-Clicks' INPATIENT SHORT FORM - BASIC MOBILITY  How much difficulty does the patient currently have...  Patient Difficulty: Turning over in bed (including adjusting bedclothes, sheets and blankets)?: A Little   Patient Difficulty: Sitting down on and standing up from a chair with arms (e.g., wheelchair, bedside commode, etc.): A Little   Patient Difficulty: Moving from lying on back to sitting on the side of the bed?: A Little   How much help from another person does the patient currently need...   Help from Another: Moving to and from a bed to a chair (including a wheelchair)?: A Little   Help from Another: Need to walk in hospital room?: A Little   Help from Another: Climbing 3-5 steps with a railing?: A Little       AM-PAC Score:  Raw Score: 18   Approx Degree of Impairment: 46.58%   Standardized Score (AM-PAC Scale): 43.63   CMS Modifier (G-Code): CK    FUNCTIONAL ABILITY STATUS  Gait Assessment   Functional Mobility/Gait Assessment  Gait  Assistance: Supervision  Distance (ft): 150  Assistive Device: Rolling walker  Pattern: R Decreased stance time  Stairs: Stoop/curb  Stoop/Curb: SBA (6\" height stoop - witnessed by PT Valentina Turpin)    Skilled Therapy Provided    Bed Mobility:  Rolling: Mod I   Supine<>Sit: Mod I (log roll - HOB flat)   Sit<>Supine: NT     Transfer Mobility:  Sit<>Stand: Mod I   Stand<>Sit: Mod I   Gait: SBA with RW    Therapist's Comments: Pt reported nausea with movement, but improved pain control.  Writer provide active listening.  Extra time provided after each transitional movement to decrease waves of nausea      THERAPEUTIC EXERCISES  Lower Extremity Ankle pumps     Upper Extremity  - open/close     Position Sitting     Repetitions   10   Sets   1     Patient End of Session: Up in chair;Needs met;Call light within reach;RN aware of session/findings;All patient questions and concerns addressed;Alarm set    PT Session Time: 23 minutes  Gait Trainin minutes  Therapeutic Activity: 15 minutes  Therapeutic Exercise: 0 minutes   Neuromuscular Re-education: 0 minutes

## 2024-08-07 NOTE — PROGRESS NOTES
Received report regarding patient around 0130am. Patient A&O x4 and follows commands. Complains of back pain related to dressing - reinforced dressing with improvement. Gave PRN pain meds for pain and repositioned. Patient resting in bed comfortably. Vitals stable. Please see MAR/flowsheets for further data.

## 2024-08-07 NOTE — PLAN OF CARE
A&O x4. VSS on RA. . RLE pain/strength improving. Denies HA tonight. Up with mod assist x1 to BSC. Declines SCDs. Coverlet dressing to low back with scan serous drainage changed tonight C/D/I. Denies N/V tonight. Reviewed POC, pain management, IS use, and fall precautions with pt. Bed alarm on w/bed in lowest position. Pt reminded to use call light. Verbalized understanding. Plan to dc home with HHC when cleared.   0125 - plan of care endorsed to Ana JOSE

## 2024-08-07 NOTE — PROGRESS NOTES
AVS reviewed, states  wants Methocarbamol & Diazepam prn but not Flexeril, Steffi JACOME contacted - will change as requested. Will dc once meds filled here delivered to bedside, mom at bedside, both verblized understanding of discharge instructions.

## 2024-08-07 NOTE — PLAN OF CARE
A&Ox4. VSS. On room air. . IS encouraged. Telemetry monitoring. SCDs. Ankle pumps encouraged. Tolerating diet. Last BM 8/6. Voiding. Pain managed with PO medication. Surgical dressing to back C/D/I. Worked with PT this morning. Plan is to dc home today when cleared. Patient updated and in agreement with plan of care. Safety precautions in place. Instructed patient to call for assistance, call light within reach.

## 2024-08-07 NOTE — CM/SW NOTE
Met with pt at bedside to discuss DC planning for HHC services.  [facility] able to accept referral for home RN/PT.  Pt in agreement with this provider.  Plan for DC today.  No other needs at this time.      Aundrea Dixon, [facility]  Discharge Planner  [phone]

## 2024-08-07 NOTE — DISCHARGE SUMMARY
[facility]     Be Bueno Patient Status:  Inpatient    1995 MRN [id number]   Location [facility] 3SW-A Attending Shiraz Hidalgo,     Day # 6 PCP Leonel West MD     Date of Admission: 2024  Date of Discharge:   2024    Discharge Disposition: Home or Self Care    Discharge Diagnosis:  Lumbar radiculopathy   Nausea  Morbid obesity     History of Present Illness: Be Bueno is a 28 year old female with low back pain for 3 months.  She had outpatient MRI at an outside facility that showed central disc herniation and she was seen by spine surgery is scheduled for surgery on .  Patient states that low back pain especially on the right is getting worse and radiating down the right leg on the anterior side associated tingling in her toes.  She denies bowel or bladder incontinence fever or chills abdominal pain.  She takes Vicodin at home as needed for pain without improvement.     Brief Synopsis:     #Lumbar radiculopathy secondary to severe L4-5 and moderate to severe L3-4 and L5-S1 canal stenosis secondary to large disc herniations (seen on MRI on )  -S/p right L4-L5 and L5-S1 microdiscectomy   -Neurosurgery following  -PT/OT  -Pain control      #Nausea  -PRN anti-emetics      #Morbid obesity  -Body mass index is 46.96 kg/m².    #Disposition  -Stable for DC home    Lace+ Score: 54  59-90 High Risk  29-58 Medium Risk  0-28   Low Risk       TCM Follow-Up Recommendation:  LACE 29-58: Moderate Risk of readmission after discharge from the hospital.      Procedures during hospitalization:   1. Posterior midline approach to lumbar spine from L4-S1 on R   2. (R side) laminectomies at levels L4-5 and L5-S1 , medial facetectomies at levels L4-5 and L5-S1, and foraminotomies at levels L4-5 and L5-S1   3. Use of microscope for decompression   4. R-sided [Micro]Diskectomy at L4-5 and L5-1   5. Use of radiographs for vertebral  level localization  6. Primary repair of incidental durotomy at L4-5 on R       Incidental or significant findings and recommendations (brief descriptions):  None    Lab/Test results pending at Discharge:   None    Consultants:  Neurosurgery    Discharge Medication List:     Medication List        START taking these medications      diazePAM 5 MG Tabs  Commonly known as: Valium  Take 1 tablet (5 mg total) by mouth every 6 (six) hours as needed (to be given if spasms not relieved by methocarbamol).     docusate sodium 100 MG Caps  Commonly known as: COLACE  Take 100 mg by mouth 2 (two) times daily.     HYDROcodone-acetaminophen 5-325 MG Tabs  Commonly known as: Norco  Take 1-2 tablets by mouth every 4 (four) hours as needed.     Methocarbamol 1000 MG Tabs  Take 500 mg by mouth every 6 (six) hours.     ondansetron 4 MG Tbdp  Commonly known as: Zofran-ODT  Take 1 tablet (4 mg total) by mouth every 6 (six) hours as needed for Nausea.            CHANGE how you take these medications      gabapentin 400 MG Caps  Commonly known as: Neurontin  Take 1 capsule (400 mg total) by mouth 3 (three) times daily.  What changed:   medication strength  how much to take            CONTINUE taking these medications      buPROPion  MG Tb12  Commonly known as: WELLBUTRIN SR            STOP taking these medications      cyclobenzaprine 10 MG Tabs  Commonly known as: Flexeril     methylPREDNISolone 4 MG Tbpk  Commonly known as: Medrol     oxyCODONE-acetaminophen 5-325 MG Tabs  Commonly known as: Percocet               Where to Get Your Medications        These medications were sent to [place], [place] - [address] [place] [address], [place] [address] [phone], [phone]  [address] [place] [address]      Phone: [phone]   diazePAM 5 MG Tabs  docusate sodium 100 MG Caps  HYDROcodone-acetaminophen 5-325 MG Tabs       [id number] [address]  AT [facility], [phone], [phone]  [address] [facility] [address]      Phone: [phone]   gabapentin 400 MG Caps  Methocarbamol 1000 MG Tabs  ondansetron 4 MG Tbdp        [place]    Follow-up appointment:   Steffi Glynn APRN  [address] [place]  [facility] [id number] [phone]    Go on 2024  11:30am for post op visit    Leonel West MD  [address] [facility] [address] [phone]    Follow up  As needed    Appointments for Next 30 Days 2024 - 2024        Date Arrival Time Visit Type Length Department Provider     2024 11:30 AM  POST OP VISIT [2853] 30 min [facility] Steffi Glynn APRN    Patient Instructions:         Location Instructions:     Masks are optional for all patients and visitors, unless otherwise indicated.               2024  3:20 PM  POST OP VISIT [2853] [facility]     Patient Instructions:         Location Instructions:     Masks are optional for all patients and visitors, unless otherwise indicated.                      Vital signs:  Temp:  [98 °F (36.7 °C)-98.4 °F (36.9 °C)] 98.1 °F (36.7 °C)  Pulse:  [78-89] 78  Resp:  [16-18] 17  BP: ()/(54-87) 117/54  SpO2:  [91 %-97 %] 92 %    Physical Exam:    General: No acute distress. Morbidly obese.   Lungs: clear to auscultation  Cardiovascular: S1, S2  Abdomen: Soft    -----------------------------------------------------------------------------------------------  PATIENT DISCHARGE INSTRUCTIONS: See electronic chart    Shiraz Hidalgo DO    Total time spent on discharge plannin minutes     The  Century Cures Act makes medical notes like these available to patients in the interest of transparency. Please be advised this is a medical document. Medical documents are intended to carry relevant information, facts as evident, and the clinical  opinion of the practitioner. The medical note is intended as peer to peer communication and may appear blunt or direct. It is written in medical language and may contain abbreviations or verbiage that are unfamiliar.

## 2024-08-07 NOTE — PROGRESS NOTES
[facility]  Neurosurgery Progress Note    Be Bueno Patient Status:  Inpatient    1995 MRN [id number]   Location [facility] 3SW-A Attending Shiraz Hidalgo,    Hosp Day # 6 PCP Leonel West MD     PRINCIPLE PROBLEM:   S/p right L4-5, L5-S1 NETTA POD #5    SUBJECTIVE     Pt examined, reports ongoing right hip pain. She feels overly sedated with Percocet and would like to switch to [place]. Worked with PT/OT yesterday. She has no new complaints otherwise.    OBJECTIVE/PHYSICAL EXAM     VITALS:  /54 (BP Location: Left arm)   Pulse 78   Temp 98.1 °F (36.7 °C) (Oral)   Resp 17   Wt 299 lb 13.2 oz (136 kg)   LMP 2024   SpO2 92%   BMI 46.96 kg/m²     GENERAL: No acute distress, non-toxic appearing, mood appropriate    HEENT: Normocephalic, atraumatic    RESP:  Respirations non-labored, even    CV:  NSR on tele    NEURO: Alert and oriented x3.  Sensation to light touch is intact bilaterally.  RICO x 4. Gait deferred.     SKIN: Surgical dressing C/D/I, changed yesterday    LABS     Lab Results   Component Value Date    K 3.8 2024     IMAGING     No new imaging to review.    ASSESSMENT & PLAN     ASSESSMENT:  S/p right L4-5, L5-S1 NETTA POD #5    PLAN:  PT/OT recommending [facility]  Dispo planning  Okay to DC from a Neurosurgical standpoint  Instructions reviewed with patient  Meds sent to inpatient pharmacy  F/u appointments scheduled    Plan was discussed with Dr. Byrne.    Steffi Glynn, APRN-NP  [facility] [place]  2024

## 2024-08-08 ENCOUNTER — TELEPHONE (OUTPATIENT)
Dept: SURGERY | Facility: CLINIC | Age: 29
End: 2024-08-08

## 2024-08-08 DIAGNOSIS — Z98.890 S/P LUMBAR MICRODISCECTOMY: Primary | ICD-10-CM

## 2024-08-08 RX ORDER — METHOCARBAMOL 500 MG/1
500 TABLET, FILM COATED ORAL EVERY 6 HOURS PRN
Qty: 30 TABLET | Refills: 0 | Status: SHIPPED | OUTPATIENT
Start: 2024-08-08

## 2024-08-08 NOTE — TELEPHONE ENCOUNTER
Pt just had sx and stated that she has pain all over and her face muscles even hurt.     Pt called requesting a new Methocarbamol prescription be sent to Pharmacy right away.     Pt stated that her Pharmacy does not have 1000 mg tabs for Methocarbamol. Pt stated that she has been taking 2 500 mg tabs.     Medication: methocarbamol 500 MG Oral Tab      Date of last refill: 8.07.24 (#30/0)  [place]      Last office visit: 7.10.24  Due back to clinic per last office note:    Date next office visit scheduled:    Future Appointments   Date Time Provider Department Center   8/14/2024  2:00 PM Steffi Glynn APRN [facility]   9/4/2024  3:20 PM CATHY Byrne DO [facility]   10/24/2024  2:30 PM Steffi Glynn APRN [facility]        Last OV note recommendation:    \"ASSESSMENT:  1. Lumbar radiculopathy       PLAN:   Recommend R L4-5 and L5-S1 NETTA\"    Routed to Provider.

## 2024-08-08 NOTE — TELEPHONE ENCOUNTER
Message below noted.     Called pt and advised her on prescription being sent to her Pharmacy.    Pt wanted confirmation on route.    New Prescription was for 1 tablet (500 mg total) by mouth every 6 (six) hours as needed.    Pt acknowledged.    Nothing further needed with this encounter.

## 2024-08-08 NOTE — PAYOR COMM NOTE
--------------  DISCHARGE REVIEW    Payor: [employer] OPEN ACCESS   Subscriber #:  [id number]  Authorization Number: [id number]    Admit date: 24  Admit time:  10:01 AM  Discharge Date: 2024  2:00 PM     Admitting Physician: Audrey Delaney MD  Attending Physician:  No att. providers found  Primary Care Physician: Leonel West MD          Discharge Summary Notes        Discharge Summary signed by Shiraz Hidalgo DO at 2024  2:09 PM       Author: Shiraz Hidalgo DO Specialty: HOSPITALIST Author Type: Physician    Filed: 2024  2:09 PM Date of Service: 2024  1:18 PM Status: Addendum    : Shiraz Hidalgo DO (Physician)    Related Notes: Original Note by Shiraz Hidalgo DO (Physician) filed at 2024  1:21 PM           [facility]     Be Bueno Patient Status:  Inpatient    1995 MRN [id number]   [facility] 3SW-A Attending Shiraz Hidalgo DO   Hosp Day # 6 PCP Leonel West MD     Date of Admission: 2024  Date of Discharge:   2024    Discharge Disposition: Home or Self Care    Discharge Diagnosis:  Lumbar radiculopathy   Nausea  Morbid obesity     History of Present Illness: Be Bueno is a 28 year old female with low back pain for 3 months.  She had outpatient MRI at an outside facility that showed central disc herniation and she was seen by spine surgery is scheduled for surgery on .  Patient states that low back pain especially on the right is getting worse and radiating down the right leg on the anterior side associated tingling in her toes.  She denies bowel or bladder incontinence fever or chills abdominal pain.  She takes Vicodin at home as needed for pain without improvement.     Brief Synopsis:     #Lumbar radiculopathy secondary to severe L4-5 and moderate to severe L3-4 and L5-S1 canal stenosis secondary to large disc herniations (seen on MRI on )  -S/p right L4-L5 and L5-S1  microdiscectomy 8/2  -Neurosurgery following  -PT/OT  -Pain control      #Nausea  -PRN anti-emetics      #Morbid obesity  -Body mass index is 46.96 kg/m².    #Disposition  -Stable for DC home    Lace+ Score: 54  59-90 High Risk  29-58 Medium Risk  0-28   Low Risk       TCM Follow-Up Recommendation:  LACE 29-58: Moderate Risk of readmission after discharge from the hospital.      Procedures during hospitalization:   1. Posterior midline approach to lumbar spine from L4-S1 on R   2. (R side) laminectomies at levels L4-5 and L5-S1 , medial facetectomies at levels L4-5 and L5-S1, and foraminotomies at levels L4-5 and L5-S1   3. Use of microscope for decompression   4. R-sided [Micro]Diskectomy at L4-5 and L5-1   5. Use of radiographs for vertebral level localization  6. Primary repair of incidental durotomy at L4-5 on R       Incidental or significant findings and recommendations (brief descriptions):  None    Lab/Test results pending at Discharge:   None    Consultants:  Neurosurgery    Discharge Medication List:     Medication List        START taking these medications      diazePAM 5 MG Tabs  Commonly known as: Valium  Take 1 tablet (5 mg total) by mouth every 6 (six) hours as needed (to be given if spasms not relieved by methocarbamol).     docusate sodium 100 MG Caps  Commonly known as: COLACE  Take 100 mg by mouth 2 (two) times daily.     HYDROcodone-acetaminophen 5-325 MG Tabs  Commonly known as: Norco  Take 1-2 tablets by mouth every 4 (four) hours as needed.     Methocarbamol 1000 MG Tabs  Take 500 mg by mouth every 6 (six) hours.     ondansetron 4 MG Tbdp  Commonly known as: Zofran-ODT  Take 1 tablet (4 mg total) by mouth every 6 (six) hours as needed for Nausea.            CHANGE how you take these medications      gabapentin 400 MG Caps  Commonly known as: Neurontin  Take 1 capsule (400 mg total) by mouth 3 (three) times daily.  What changed:   medication strength  how much to take            CONTINUE taking  these medications      buPROPion  MG Tb12  Commonly known as: WELLBUTRIN SR            STOP taking these medications      cyclobenzaprine 10 MG Tabs  Commonly known as: Flexeril     methylPREDNISolone 4 MG Tbpk  Commonly known as: Medrol     oxyCODONE-acetaminophen 5-325 MG Tabs  Commonly known as: Percocet               Where to Get Your Medications        These medications were sent to [facility] - [address], [address] [phone], [phone]  [address] [facility], [address], [facility] [address]      Phone: [phone]   diazePAM 5 MG Tabs  docusate sodium 100 MG Caps  HYDROcodone-acetaminophen 5-325 MG Tabs       These medications were sent to [id number] - [facility] [address] [facility] AT [facility] [address], [phone], [phone]  [address] [facility] [address]      Phone: [phone]   gabapentin 400 MG Caps  Methocarbamol 1000 MG Tabs  ondansetron 4 MG Tbdp        [place]    Follow-up appointment:   Steffi Glynn APRN  [address]   [address] [address] [phone]    Go on 8/9/2024  11:30am for post op visit    Leonel West MD  [address] [facility] [address] [phone]    Follow up  As needed    Appointments for Next 30 Days 8/7/2024 - 9/6/2024        Date Arrival Time Visit Type Length Department Provider     8/9/2024 11:30 AM  POST OP VISIT [2853] [facility]    Patient Instructions:         Location Instructions:     Masks are optional for all patients and visitors, unless otherwise indicated.               9/4/2024  3:20 PM  POST OP VISIT [2853] [facility]    Patient Instructions:         Location Instructions:     Masks are optional for all patients and visitors, unless otherwise indicated.                      Vital signs:  Temp:  [98 °F (36.7  °C)-98.4 °F (36.9 °C)] 98.1 °F (36.7 °C)  Pulse:  [78-89] 78  Resp:  [16-18] 17  BP: ()/(54-87) 117/54  SpO2:  [91 %-97 %] 92 %    Physical Exam:    General: No acute distress. Morbidly obese.   Lungs: clear to auscultation  Cardiovascular: S1, S2  Abdomen: Soft    -----------------------------------------------------------------------------------------------  PATIENT DISCHARGE INSTRUCTIONS: See electronic chart    Shiraz Hidalgo DO    Total time spent on discharge plannin minutes     The  Century Cures Act makes medical notes like these available to patients in the interest of transparency. Please be advised this is a medical document. Medical documents are intended to carry relevant information, facts as evident, and the clinical opinion of the practitioner. The medical note is intended as peer to peer communication and may appear blunt or direct. It is written in medical language and may contain abbreviations or verbiage that are unfamiliar.       Electronically signed by Shiraz Hidalgo DO on 2024  2:09 PM         REVIEWER COMMENTS

## 2024-08-09 ENCOUNTER — TELEPHONE (OUTPATIENT)
Dept: SURGERY | Facility: CLINIC | Age: 29
End: 2024-08-09

## 2024-08-09 NOTE — TELEPHONE ENCOUNTER
Pt reminder received.     Pt had surgery on 8.02.24 with Dr. Byrne.    Sx: RIGHT LUMBAR 4-LUMBAR 5, LUMBAR 5-SACRAL 1 MICRODISCECTOMY.    Routed to [place] for outreach call.

## 2024-08-09 NOTE — TELEPHONE ENCOUNTER
For Lumbar Sx:    Patient had RIGHT LUMBAR 4-LUMBAR 5, LUMBAR 5-SACRAL 1 MICRODISCECTOMY.     Post op day 7    1)Asked how Be is feeling in general she stated:  \"Quite a bit of pain today, my bad leg is not doing good right now, back hurts.\"    2) Discussed Dressing with patient, informed them dressing can be removed on day 3,  Pt acknowledged    3)Site check for redness, drainage, swelling, discoloration, fever, etc.  Pt states incision site absent from all s/s of infection at this time, advised to call for help with any changes.    4) Discussed Current Pain Level:  Pain currently 6-7/10    5) Discussed Symptom improvement with patient they stated:  Unable to tell at this time r/t pain control    6) Discussed medication and asked if he/she currently needs any refills, Pt stated:   Pt denies needing any refills at this time, states pain was more well-managed in hospital due to more pain meds, however pain is being managed at this time with current medications.     7) Confirm post-op appointments with patient:  All 3 post-op appts scheduled as ordered.    8) Discussed Surgical Restrictions with patient:  No baths/pool/hot tub  No bending, lifting, twisting at least until 2 week follow up  Pt acknowledged.    9)Verified if pt had any other issues they needed to discuss:  N/A     10)Provided update on FMLA (received, initiated, need signature, completed etc)  N/A        Routed to provider for update.

## 2024-08-13 NOTE — PROGRESS NOTES
[facility]  Neurosurgery Clinic Visit: Post-Op Follow Up  24     Be Bueno PCP: Leonel Wset MD    1995 MRN [id number]     REASON FOR VISIT: 1 week post-op follow up    SUBJECTIVE     Be Bueno is a pleasant 28 year old female who presents for follow up s/p right L4-5, L5-S1 NETTA by Dr. Byrne on 24.  Patient was discharged from the hospital on 24. She is accompanied by her .     Since surgery, she endorses significant improvement in back pain and RLE pain. She continues to report the majority of her post-op pain is to her right leg. She has ongoing numbness to the R lateral calf and toes of R foot. She has a deficit with R dorsiflexion and has difficulty wiggling the toes of the right foot. She is ambulating without difficulty; states she has been \"very busy\" since surgery. She also endorses daily headaches, positional in nature - worse when upright, improved upon lying flat. Headaches were present in hospital but improved by the time of discharge. Headaches were initially occurring late at night, now are occurring earlier in the day. She is taking Norco and methocarbamol, with adequately control her back and leg pain but do not offer relief for headaches. Incision is without drainage or complication. She denies fevers, chills, or ill-symptoms. Denies dizziness, visual disturbance, changes in speech, paresthesias, or focal weakness.    MEDICAL HISTORY     Past Medical History:    Back problem    Depression    Muscle weakness    RIGHT LEG      Past Surgical History:   Procedure Laterality Date    Back surgery  2024    RIGHT LUMBAR 4-LUMBAR 5, LUMBAR 5-SACRAL 1 MICRODISCECTOMY          Tonsillectomy        No family history on file.   Social History     Socioeconomic History    Marital status: Single   Tobacco Use    Smoking status: Every Day     Current packs/day: 1.00     Types: Cigarettes    Smokeless tobacco: Never    Vaping Use    Vaping status: Never Used   Substance and Sexual Activity    Alcohol use: Not Currently    Drug use: Never      No Known Allergies     MEDICATIONS     No outpatient medications have been marked as taking for the 8/14/24 encounter (Appointment) with Steffi Glynn APRN.       REVIEW OF SYSTEMS     Denies fever, chills, shortness of breath, chest pain, or calf pain.     PHYSICAL EXAMINATION     VITALS: /78   Pulse 75   LMP 07/01/2024   SpO2 96%     GENERAL:  No acute distress, non-toxic appearing    HEENT:  Normocephalic, atraumatic    SKIN: Warm, dry. Surgical incision is approximated with sutures. Incision is clean, dry and intact without signs of drainage, edema, or erythema.   Incision was prepped and sutures were removed without incident during this visit.     NEUROLOGICAL: Alert and oriented x 3.  Sensation to light touch is intact bilaterally. Gait intact, non-antalgic. DTRs 2/4 bilaterally.      LOWER EXTREMITY STRENGTH:    Iliospoas  Hamstrings  Quads  D-flexion  P-flexion EHL     Right 5 5 5 5 5 5     Left 5 5 5 5 5 5     IMAGING     No new imaging to review    ASSESSMENT AND PLAN     ASSESSMENT:   1. S/P lumbar microdiscectomy       PLAN:   Methocarbamol refilled  Start Fiorecet q6h PRN  Advised not to take concurrently with Norco  Advised to take in evening - instead of Norco - as onset of headaches occurs later in day.   Continue to minimize bending, lifting, twisting  No lifting greater than 5 lbs  Reviewed activity restrictions and incisional care  Okay to RTW light duty with restrictions - letter provided  F/U with Dr. Byrne in 3 weeks      Steffi Glynn, MSN, APRN, FNP-[facility]   [address] [place] [address], [address]  [place] [address]  [phone]

## 2024-08-14 ENCOUNTER — OFFICE VISIT (OUTPATIENT)
Dept: SURGERY | Facility: CLINIC | Age: 29
End: 2024-08-14
Payer: COMMERCIAL

## 2024-08-14 VITALS — HEART RATE: 75 BPM | DIASTOLIC BLOOD PRESSURE: 78 MMHG | OXYGEN SATURATION: 96 % | SYSTOLIC BLOOD PRESSURE: 134 MMHG

## 2024-08-14 DIAGNOSIS — Z98.890 S/P LUMBAR MICRODISCECTOMY: Primary | ICD-10-CM

## 2024-08-14 PROCEDURE — 99024 POSTOP FOLLOW-UP VISIT: CPT

## 2024-08-14 RX ORDER — BUTALBITAL, ACETAMINOPHEN AND CAFFEINE 50; 325; 40 MG/1; MG/1; MG/1
1 TABLET ORAL EVERY 4 HOURS PRN
Qty: 20 TABLET | Refills: 0 | Status: SHIPPED | OUTPATIENT
Start: 2024-08-14

## 2024-08-14 RX ORDER — ALBUTEROL SULFATE 90 UG/1
2 AEROSOL, METERED RESPIRATORY (INHALATION) EVERY 4 HOURS PRN
COMMUNITY
Start: 2024-07-12

## 2024-08-14 RX ORDER — METHOCARBAMOL 500 MG/1
500 TABLET, FILM COATED ORAL EVERY 6 HOURS PRN
Qty: 30 TABLET | Refills: 1 | Status: SHIPPED | OUTPATIENT
Start: 2024-08-14

## 2024-08-14 NOTE — PATIENT INSTRUCTIONS
Refill policies:    Allow 2-3 business days for refills; controlled substances may take longer.  Contact your pharmacy at least 5 days prior to running out of medication and have them send an electronic request or submit request through the “request refill” option in your [employer] account.  Refills are not addressed on weekends; covering physicians do not authorize routine medications on weekends.  No narcotics or controlled substances are refilled after noon on Fridays or by on call physicians.  By law, narcotics must be electronically prescribed.  A 30 day supply with no refills is the maximum allowed.  If your prescription is due for a refill, you may be due for a follow up appointment.  To best provide you care, patients receiving routine medications need to be seen at least once a year.  Patients receiving narcotic/controlled substance medications need to be seen at least once every 3 months.  In the event that your preferred pharmacy does not have the requested medication in stock (e.g. Backordered), it is your responsibility to find another pharmacy that has the requested medication available.  We will gladly send a new prescription to that pharmacy at your request.    Scheduling Tests:    If your physician has ordered radiology tests such as MRI or CT scans, please contact Central Scheduling at [phone] right away to schedule the test.  Once scheduled, the [facility] Centralized Referral Team will work with your insurance carrier to obtain pre-certification or prior authorization.  Depending on your insurance carrier, approval may take 3-10 days.  It is highly recommended patients assure they have received an authorization before having a test performed.  If test is done without insurance authorization, patient may be responsible for the entire amount billed.      Precertification and Prior Authorizations:  If your physician has recommended that you have a procedure or additional testing performed the [facility]  Centralized Referral Team will contact your insurance carrier to obtain pre-certification or prior authorization.    You are strongly encouraged to contact your insurance carrier to verify that your procedure/test has been approved and is a COVERED benefit.  Although the [facility] Centralized Referral Team does its due diligence, the insurance carrier gives the disclaimer that \"Although the procedure is authorized, this does not guarantee payment.\"    Ultimately the patient is responsible for payment.   Thank you for your understanding in this matter.  Paperwork Completion:  If you require FMLA or disability paperwork for your recovery, please make sure to either drop it off or have it faxed to our office at [phone]. Be sure the form has your name and date of birth on it.  The form will be faxed to our Forms Department and they will complete it for you.  There is a 25$ fee for all forms that need to be filled out.  Please be aware there is a 10-14 day turnaround time.  You will need to sign a release of information (CALVIN) form if your paperwork does not come with one.  You may call the Forms Department with any questions at [phone].  Their fax number is [phone].

## 2024-08-14 NOTE — PROGRESS NOTES
Patient is here today for Post Op visit --2024 RIGHT LUMBAR 4-LUMBAR 5, LUMBAR 5-SACRAL 1 MICRODISCECTOMY     Imagin2024 XR OR Lumbar    Treatments: Norco, Muscle relaxant and Gabapentin    Pain Scale:  3/10 w/ pain meds prn

## 2024-08-23 DIAGNOSIS — Z98.890 S/P LUMBAR MICRODISCECTOMY: ICD-10-CM

## 2024-08-23 RX ORDER — HYDROCODONE BITARTRATE AND ACETAMINOPHEN 5; 325 MG/1; MG/1
1-2 TABLET ORAL EVERY 4 HOURS PRN
Qty: 60 TABLET | Refills: 0 | Status: SHIPPED | OUTPATIENT
Start: 2024-08-23

## 2024-08-23 NOTE — TELEPHONE ENCOUNTER
Refill request received.    Medication: HYDROcodone-acetaminophen 5-325 MG Oral Tab      Date of last refill: 8.07.24 (#60/0)  [place]      Last office visit: 8.14.24  Due back to clinic per last office note:  3 weeks  Date next office visit scheduled:    Future Appointments   Date Time Provider Department Center   9/4/2024  3:20 PM CATHY Byrne,  [facility]   10/24/2024  2:30 PM Steffi Glynn, APRN [facility]        Last OV note recommendation:    \"ASSESSMENT:   1. S/P lumbar microdiscectomy       PLAN:   Methocarbamol refilled  Start Fiorecet q6h PRN  Advised not to take concurrently with Norco  Advised to take in evening - instead of Norco - as onset of headaches occurs later in day.   Continue to minimize bending, lifting, twisting  No lifting greater than 5 lbs  Reviewed activity restrictions and incisional care  Okay to RTW light duty with restrictions - letter provided  F/U with Dr. Byrne in 3 weeks\"    Routed to Provider.

## 2024-08-23 NOTE — TELEPHONE ENCOUNTER
From: Be Bueno  To: Office of Steffi Glynn  Sent: 8/23/2024 1:21 AM CDT  Subject: Medication Renewal Request    Refills have been requested for the following medications:     HYDROcodone-acetaminophen 5-325 MG Oral Tab [Steffi Glynn]    Preferred pharmacy: [address], [address] [phone], [phone]

## 2024-08-26 RX ORDER — HYDROCODONE BITARTRATE AND ACETAMINOPHEN 5; 325 MG/1; MG/1
1-2 TABLET ORAL EVERY 4 HOURS PRN
Qty: 60 TABLET | Refills: 0 | Status: SHIPPED | OUTPATIENT
Start: 2024-08-26

## 2024-08-26 NOTE — TELEPHONE ENCOUNTER
Message below noted.    Pt requesting prescription be sent to alternate pharmacy.    Routed to [facility] pool.

## 2024-08-26 NOTE — TELEPHONE ENCOUNTER
Pt states refill was sent to [facility], pt needs script sent to :  [id number] - [address], [phone], [phone] .

## 2024-09-04 ENCOUNTER — OFFICE VISIT (OUTPATIENT)
Dept: SURGERY | Facility: CLINIC | Age: 29
End: 2024-09-04
Payer: COMMERCIAL

## 2024-09-04 VITALS
SYSTOLIC BLOOD PRESSURE: 130 MMHG | HEART RATE: 73 BPM | WEIGHT: 293 LBS | HEIGHT: 67 IN | BODY MASS INDEX: 45.99 KG/M2 | DIASTOLIC BLOOD PRESSURE: 82 MMHG

## 2024-09-04 DIAGNOSIS — Z98.890 S/P LUMBAR MICRODISCECTOMY: Primary | ICD-10-CM

## 2024-09-04 PROCEDURE — 99024 POSTOP FOLLOW-UP VISIT: CPT | Performed by: NEUROLOGICAL SURGERY

## 2024-09-04 NOTE — PROGRESS NOTES
Established patient:  Reason for follow up:   4 week post op, sx 08/02/24    Numeric Rating Scale:         Pain at Present:  4/10

## 2024-09-04 NOTE — PROGRESS NOTES
S: patient doing well. Denies any headaches.      O: AVSS   Neuro: stable    A/P s/p NETTA  -2 months  -start PT

## 2024-09-09 ENCOUNTER — APPOINTMENT (OUTPATIENT)
Dept: NEUROSURGERY | Age: 29
End: 2024-09-09

## 2024-09-17 DIAGNOSIS — Z98.890 S/P LUMBAR MICRODISCECTOMY: ICD-10-CM

## 2024-09-18 RX ORDER — HYDROCODONE BITARTRATE AND ACETAMINOPHEN 5; 325 MG/1; MG/1
1-2 TABLET ORAL EVERY 6 HOURS PRN
Qty: 40 TABLET | Refills: 0 | Status: SHIPPED | OUTPATIENT
Start: 2024-09-18

## 2024-09-18 NOTE — TELEPHONE ENCOUNTER
Medication:   HYDROcodone-acetaminophen 5-325 MG Oral Tab 60 tablet 0 8/26/2024 --   Sig:   Take 1-2 tablets by mouth every 4 (four) hours as needed for Pain.          Date of last refill: 8.26.24 (#60/0)  [place]      Last office visit: 9.4.24  Due back to clinic per last office note:  2 months   Date next office visit scheduled:  10.24.24  Future Appointments   Date Time Provider Department Center   10/24/2024  2:30 PM Steffi Glynn, APRN [facility]           Last OV note recommendation:       \" A/P s/p NETTA  -2 months  -start PT \"

## 2024-09-29 ENCOUNTER — PATIENT MESSAGE (OUTPATIENT)
Dept: SURGERY | Facility: CLINIC | Age: 29
End: 2024-09-29

## 2024-09-29 DIAGNOSIS — M54.16 LUMBAR RADICULOPATHY: ICD-10-CM

## 2024-09-30 RX ORDER — METHYLPREDNISOLONE 4 MG
TABLET, DOSE PACK ORAL
Qty: 1 EACH | Refills: 0 | Status: SHIPPED | OUTPATIENT
Start: 2024-09-30

## 2024-09-30 NOTE — TELEPHONE ENCOUNTER
From: Be Bueno  To: Steffi Glynn  Sent: 9/29/2024 11:35 PM CDT  Subject: My back     Hello, Friday morning I bent down the wrong way and far, something shifted and popped in my lower back, I felt it, and ever since my back has hurt and it’s getting worse, I cant even sit or lay down comfortably the pain starts lower back to the mid back, and it’s in my spine, this pain is new and it radiates thru out my whole lower and mid back, but that spinal pain I feel shoot up my spine worries me, I’m terrified

## 2024-09-30 NOTE — TELEPHONE ENCOUNTER
From: Be Bueno  To: Lesley Byrne  Sent: 9/29/2024 11:35 PM CDT  Subject: My back     Hello, Friday morning I bent down the wrong way and far, something shifted and popped in my lower back, I felt it, and ever since my back has hurt and it’s getting worse, I cant even sit or lay down comfortably the pain starts lower back to the mid back, and it’s in my spine, this pain is new and it radiates thru out my whole lower and mid back, but that spinal pain I feel shoot up my spine worries me, I’m terrified

## 2024-09-30 NOTE — TELEPHONE ENCOUNTER
Patient had surgery with Dr. Byrne on 7/31/24 for R L4-L5 microdiscectomy.  He was seen in office for post surgical follow up on 9/4/24 and was doing well at that time.    Discussed in detail with Dr. Byrne who stated that as long as there is no new onset of weakness we will prescribe a medrol dose pack.    Called and spoke with patient who denies any new onset of weakness or saddle anesthesia.  She is reporting continued left lower back pain that radiates into her sciatic particularly left side and down her leg.  Symptoms worse when sitting down and when walking.  Advised patient of plan for steroid, patient acknowledged.    Patient advised to call office if she does experience any new onset of weakness or saddle anesthesia, patient also advised to call the office if she feels no improvement with steroid pack if that is the case patient will need to come in for off this visit and new MRI may be required.    Patient was notified of red flag symptoms and when to proceed to the emergency room.  Medrol dose pack order placed per provider request with verbal order.

## 2024-10-24 ENCOUNTER — OFFICE VISIT (OUTPATIENT)
Dept: SURGERY | Facility: CLINIC | Age: 29
End: 2024-10-24
Payer: COMMERCIAL

## 2024-10-24 VITALS
HEIGHT: 67 IN | BODY MASS INDEX: 45.99 KG/M2 | SYSTOLIC BLOOD PRESSURE: 130 MMHG | WEIGHT: 293 LBS | DIASTOLIC BLOOD PRESSURE: 82 MMHG

## 2024-10-24 DIAGNOSIS — M54.6 ACUTE RIGHT-SIDED THORACIC BACK PAIN: ICD-10-CM

## 2024-10-24 DIAGNOSIS — Z98.890 S/P LUMBAR MICRODISCECTOMY: Primary | ICD-10-CM

## 2024-10-24 DIAGNOSIS — M54.12 CERVICAL RADICULOPATHY: ICD-10-CM

## 2024-10-24 PROCEDURE — 99024 POSTOP FOLLOW-UP VISIT: CPT

## 2024-10-24 RX ORDER — HYDROXYZINE HYDROCHLORIDE 25 MG/1
1 TABLET, FILM COATED ORAL EVERY 8 HOURS PRN
COMMUNITY
Start: 2024-09-30

## 2024-10-24 RX ORDER — POLYETHYLENE GLYCOL-3350 AND ELECTROLYTES 236; 6.74; 5.86; 2.97; 22.74 G/274.31G; G/274.31G; G/274.31G; G/274.31G; G/274.31G
POWDER, FOR SOLUTION ORAL
COMMUNITY
Start: 2024-09-05

## 2024-10-24 NOTE — PROGRESS NOTES
[facility]  Neurosurgery Clinic Visit: Post-Op Follow Up  10/24/24     Be Bueno PCP: Leonel West MD    1995 MRN [id number]     REASON FOR VISIT: 3 month post-op follow up    SUBJECTIVE     Be Bueno is a pleasant 29 year old female here for follow up s/p right L4-5, L5-S1 microdiscectomy on 24 by Dr. Byrne. Patient had been doing well up until 4 weeks ago when she bent forward and felt a \"pop and shift\" in her mid upper back. She also had onset of posterior neck pain radiating into the R shoulder and down the RUE into the fingers. She has since had difficulty sleeping on her right side and has pain when lifting her hand to eat. She was prescribed a MDP, which helped symptoms for only 2 days. Denies any pain, paresthesias or weakness of upper or lower extremities. Denies gait instability. Denies changes in bowel/bladder habits.     MEDICAL HISTORY     Past Medical History:    Back problem    Depression    Muscle weakness    RIGHT LEG      Past Surgical History:   Procedure Laterality Date    Back surgery  2024    RIGHT LUMBAR 4-LUMBAR 5, LUMBAR 5-SACRAL 1 MICRODISCECTOMY          Tonsillectomy        History reviewed. No pertinent family history.   Social History     Tobacco Use    Smoking status: Every Day     Current packs/day: 1.00     Types: Cigarettes    Smokeless tobacco: Never   Vaping Use    Vaping status: Never Used   Substance and Sexual Activity    Alcohol use: Not Currently    Drug use: Never      Allergies[1]     MEDICATIONS      hydrOXYzine 25 MG Oral Tab Take 1 tablet (25 mg total) by mouth every 8 (eight) hours as needed.      GAVILYTE-G 236 g Oral Recon Soln       HYDROcodone-acetaminophen 5-325 MG Oral Tab Take 1-2 tablets by mouth every 6 (six) hours as needed for Pain. 40 tablet 0    HYDROcodone-acetaminophen 5-325 MG Oral Tab Take 1-2 tablets by mouth every 4 (four) hours as needed. 60 tablet 0    albuterol 108 (90 Base)  MCG/ACT Inhalation Aero Soln Inhale 2 puffs into the lungs every 4 (four) hours as needed for Wheezing.      butalbital-acetaminophen-caffeine -40 MG Oral Tab Take 1 tablet by mouth every 4 (four) hours as needed for Headaches. 20 tablet 0    diazePAM 5 MG Oral Tab Take 1 tablet (5 mg total) by mouth every 6 (six) hours as needed (to be given if spasms not relieved by methocarbamol). 30 tablet 0    ondansetron 4 MG Oral Tablet Dispersible Take 1 tablet (4 mg total) by mouth every 6 (six) hours as needed for Nausea. 30 tablet 0    buPROPion  MG Oral Tablet 12 Hr Take 1 tablet (150 mg total) by mouth 2 (two) times daily.         REVIEW OF SYSTEMS     Denies fever, chills, shortness of breath, chest pain, or calf pain.     PHYSICAL EXAMINATION     VITALS: /82 (BP Location: Right arm, Patient Position: Sitting, Cuff Size: large)   Ht 67\"   Wt (!) 310 lb (140.6 kg)   LMP 07/01/2024   BMI 48.55 kg/m²     GENERAL:  No acute distress, non-toxic appearing    HEENT:  Normocephalic, atraumatic    SKIN: Warm, dry. Surgical incision healed    MUSCULOSKELETAL: Moving all extremities freely. Tender to palpation to right paraspinal cervical muscles and right trapezius muscles.      NEUROLOGICAL: Alert and oriented x 3.  Sensation to light touch is intact bilaterally. Gait intact, non-antalgic. Negative Spurling's. Negative Patel's. No clonus.     UPPER EXTREMITY STRENGTH:    Deltoid  Biceps  Triceps     Finger abduction   Right 4+ 5 5 5 5   Left 5 5 5 5 5      LOWER EXTREMITY STRENGTH:    Iliospoas  Hamstrings  Quads  D-flexion  P-flexion EHL     Right 5 5 5 5 5 5     Left 5 5 5 5 5 5     IMAGING     No new imaging to review.    ASSESSMENT AND PLAN     ASSESSMENT:   1. S/P lumbar microdiscectomy    2. Acute right-sided thoracic back pain    3. Cervical radiculopathy       PLAN:   Start PT   XR thoracic spine  XR cervical spine  F/U in 6 weeks after PT to f/u on cervical radiculopathy and review  imaging  F/U sooner for any new or worsening signs or symptoms    Steffi Glynn, MSN, APRN, FN-[facility]  Neurosurgery   [address], [address]  [place] [address] [phone]          [1] No Known Allergies

## 2024-10-24 NOTE — PATIENT INSTRUCTIONS
Refill policies:    Allow 2-3 business days for refills; controlled substances may take longer.  Contact your pharmacy at least 5 days prior to running out of medication and have them send an electronic request or submit request through the “request refill” option in your [employer] account.  Refills are not addressed on weekends; covering physicians do not authorize routine medications on weekends.  No narcotics or controlled substances are refilled after noon on Fridays or by on call physicians.  By law, narcotics must be electronically prescribed.  A 30 day supply with no refills is the maximum allowed.  If your prescription is due for a refill, you may be due for a follow up appointment.  To best provide you care, patients receiving routine medications need to be seen at least once a year.  Patients receiving narcotic/controlled substance medications need to be seen at least once every 3 months.  In the event that your preferred pharmacy does not have the requested medication in stock (e.g. Backordered), it is your responsibility to find another pharmacy that has the requested medication available.  We will gladly send a new prescription to that pharmacy at your request.    Scheduling Tests:    If your physician has ordered radiology tests such as MRI or CT scans, please contact Central Scheduling at [phone] right away to schedule the test.  Once scheduled, the [facility] Centralized Referral Team will work with your insurance carrier to obtain pre-certification or prior authorization.  Depending on your insurance carrier, approval may take 3-10 days.  It is highly recommended patients assure they have received an authorization before having a test performed.  If test is done without insurance authorization, patient may be responsible for the entire amount billed.      Precertification and Prior Authorizations:  If your physician has recommended that you have a procedure or additional testing performed the [facility]  Centralized Referral Team will contact your insurance carrier to obtain pre-certification or prior authorization.    You are strongly encouraged to contact your insurance carrier to verify that your procedure/test has been approved and is a COVERED benefit.  Although the [facility] Centralized Referral Team does its due diligence, the insurance carrier gives the disclaimer that \"Although the procedure is authorized, this does not guarantee payment.\"    Ultimately the patient is responsible for payment.   Thank you for your understanding in this matter.  Paperwork Completion:  If you require FMLA or disability paperwork for your recovery, please make sure to either drop it off or have it faxed to our office at [phone]. Be sure the form has your name and date of birth on it.  The form will be faxed to our Forms Department and they will complete it for you.  There is a 25$ fee for all forms that need to be filled out.  Please be aware there is a 10-14 day turnaround time.  You will need to sign a release of information (CALVIN) form if your paperwork does not come with one.  You may call the Forms Department with any questions at [phone].  Their fax number is [phone].

## 2024-10-24 NOTE — PROGRESS NOTES
Established patient:  Reason for follow up: 3 month post op RIGHT LUMBAR 4-LUMBAR 5, LUMBAR 5-SACRAL 1 MICRODISCECTOMY     Numeric Rating Scale:        Pain at Present:  3/10       Distribution of Pain:    right    Most recent treatments for Current Pain Condition:   Surgery  Response to treatment: some relief

## 2024-10-28 ENCOUNTER — PATIENT MESSAGE (OUTPATIENT)
Dept: SURGERY | Facility: CLINIC | Age: 29
End: 2024-10-28

## 2024-10-28 NOTE — TELEPHONE ENCOUNTER
Noted that patient has messaged with an update, stating that:    -pain that was previously focused in left back below shoulder blade has migrated to the right side, appearing bilaterally.   -she would like to know if she should \"try another steroid pack?\"  -she has 2 Norco left and is saving them for when pain increases and she has a \"really bad day\".     Pt underwent surgery on 8.02.24 with Dr. Byrne.     Sx: RIGHT LUMBAR 4-LUMBAR 5, LUMBAR 5-SACRAL 1 MICRODISCECTOMY.    Per MARIA M Bales at office visit on 10/24/24:    \"Patient had been doing well up until 4 weeks ago when she bent forward and felt a \"pop and shift\" in her mid upper back. She also had onset of posterior neck pain radiating into the R shoulder and down the RUE into the fingers. She has since had difficulty sleeping on her right side and has pain when lifting her hand to eat. She was prescribed a MDP, which helped symptoms for only 2 days. Denies any pain, paresthesias or weakness of upper or lower extremities. Denies gait instability. Denies changes in bowel/bladder habits.     ASSESSMENT:   1. S/P lumbar microdiscectomy    2. Acute right-sided thoracic back pain    3. Cervical radiculopathy       PLAN:   Start PT   XR thoracic spine  XR cervical spine  F/U in 6 weeks after PT to f/u on cervical radiculopathy and review imaging  F/U sooner for any new or worsening signs or symptoms\"    Routed to provider.

## 2024-10-30 NOTE — TELEPHONE ENCOUNTER
Previous [facility] pt asking for a refill. She is scheduled to follow up with you 11/12/2020. RX has been updated to reflect what she states she is taking.     Please review.  Thank you.    Sent patient a response via [employer].

## 2024-11-04 ENCOUNTER — HOSPITAL ENCOUNTER (OUTPATIENT)
Dept: GENERAL RADIOLOGY | Facility: HOSPITAL | Age: 29
Discharge: HOME | End: 2024-11-04
Payer: COMMERCIAL

## 2024-11-04 DIAGNOSIS — M54.12 CERVICAL RADICULOPATHY: ICD-10-CM

## 2024-11-04 DIAGNOSIS — M54.6 ACUTE RIGHT-SIDED THORACIC BACK PAIN: ICD-10-CM

## 2024-11-04 PROCEDURE — 72050 X-RAY EXAM NECK SPINE 4/5VWS: CPT

## 2024-11-04 PROCEDURE — 72070 X-RAY EXAM THORAC SPINE 2VWS: CPT

## 2024-11-04 PROCEDURE — 72072 X-RAY EXAM THORAC SPINE 3VWS: CPT

## 2024-11-20 ENCOUNTER — TELEPHONE (OUTPATIENT)
Facility: CLINIC | Age: 29
End: 2024-11-20

## 2024-11-20 DIAGNOSIS — M25.511 RIGHT SHOULDER PAIN, UNSPECIFIED CHRONICITY: Primary | ICD-10-CM

## 2024-11-20 NOTE — TELEPHONE ENCOUNTER
Patient scheduled for right shoulder pain.    Future Appointments   Date Time Provider Department Center   12/5/2024 12:00 PM Gonzalo Kelley,  [facility]       Please advise if imaging is needed.

## 2024-12-04 ENCOUNTER — OFFICE VISIT (OUTPATIENT)
Facility: CLINIC | Age: 29
End: 2024-12-04
Payer: COMMERCIAL

## 2024-12-04 VITALS
SYSTOLIC BLOOD PRESSURE: 124 MMHG | BODY MASS INDEX: 45.99 KG/M2 | HEIGHT: 67 IN | DIASTOLIC BLOOD PRESSURE: 82 MMHG | WEIGHT: 293 LBS

## 2024-12-04 DIAGNOSIS — Z30.8 ENCOUNTER FOR OTHER CONTRACEPTIVE MANAGEMENT: ICD-10-CM

## 2024-12-04 DIAGNOSIS — O03.9 MISCARRIAGE (HCC): Primary | ICD-10-CM

## 2024-12-04 DIAGNOSIS — Z01.818 PRE-OP TESTING: Primary | ICD-10-CM

## 2024-12-04 LAB
CONTROL LINE PRESENT WITH A CLEAR BACKGROUND (YES/NO): YES YES/NO
KIT LOT #: NORMAL NUMERIC
PREGNANCY TEST, URINE: NEGATIVE

## 2024-12-04 PROCEDURE — 81025 URINE PREGNANCY TEST: CPT | Performed by: OBSTETRICS & GYNECOLOGY

## 2024-12-04 PROCEDURE — 99212 OFFICE O/P EST SF 10 MIN: CPT | Performed by: OBSTETRICS & GYNECOLOGY

## 2024-12-04 RX ORDER — GABAPENTIN 400 MG/1
400 CAPSULE ORAL 3 TIMES DAILY
COMMUNITY

## 2024-12-04 NOTE — PROGRESS NOTES
Gynecology Office Visit      Be Bueno is a 29 year old female  Patient's last menstrual period was 2024. (contraception:  none)     HPI:     Chief Complaint   Patient presents with    Consult     MAB : patient reports that two weeks ago she was having bleeding that started slow and progressed to heavy bleeding. Passed clots. In office UPT negative.     Wants to TL - two of her children have an obesity gene - doesn't want to bring more children into the world with this disorder      Chart and previous encounters reviewed.  HISTORY:  Past Medical History:    Arthritis    Asthma (HCC)    Back problem    Depression    Mental disorder    Muscle weakness    RIGHT LEG      Past Surgical History:   Procedure Laterality Date    Back surgery  2024    RIGHT LUMBAR 4-LUMBAR 5, LUMBAR 5-SACRAL 1 MICRODISCECTOMY          Tonsillectomy        Family History   Problem Relation Age of Onset    Depression Father     Arthritis Father     Asthma Father     Arthritis Mother     Depression Mother     Hypertension Maternal Grandfather     Other (brain and lung cancer) Paternal Grandmother 50 - 59    Hypertension Paternal Grandfather     Hypertension Brother     Diabetes Brother     Depression Brother     Brain Cancer Cousin     Other (mets) Paternal Aunt         late 30s      Social History:   Social History     Socioeconomic History    Marital status: Single   Tobacco Use    Smoking status: Every Day     Current packs/day: 1.00     Types: Cigarettes     Passive exposure: Current    Smokeless tobacco: Never   Vaping Use    Vaping status: Never Used   Substance and Sexual Activity    Alcohol use: Not Currently    Drug use: Never    Sexual activity: Yes     Partners: Male     Birth control/protection: Condom, Rhythm     Social Drivers of Health     Food Insecurity: Food Insecurity Present (2024)    Food Insecurity     Food Insecurity: Sometimes true   Transportation Needs: No  Transportation Needs (7/31/2024)    Transportation Needs     Lack of Transportation: No   Housing Stability: Low Risk  (7/31/2024)    Housing Stability     Housing Instability: No        Medications (Active prior to today's visit):  Current Outpatient Medications   Medication Sig Dispense Refill    Naproxen Sodium (ALEVE OR) Take 600 mg by mouth as needed.      gabapentin 400 MG Oral Cap Take 1 capsule (400 mg total) by mouth 3 (three) times daily.      albuterol 108 (90 Base) MCG/ACT Inhalation Aero Soln Inhale 2 puffs into the lungs every 4 (four) hours as needed for Wheezing.      buPROPion  MG Oral Tablet 12 Hr Take 1 tablet (150 mg total) by mouth 2 (two) times daily.      hydrOXYzine 25 MG Oral Tab Take 1 tablet (25 mg total) by mouth every 8 (eight) hours as needed. (Patient not taking: Reported on 12/4/2024)      GAVILYTE-G 236 g Oral Recon Soln  (Patient not taking: Reported on 12/4/2024)      HYDROcodone-acetaminophen 5-325 MG Oral Tab Take 1-2 tablets by mouth every 6 (six) hours as needed for Pain. (Patient not taking: Reported on 12/4/2024) 40 tablet 0    HYDROcodone-acetaminophen 5-325 MG Oral Tab Take 1-2 tablets by mouth every 4 (four) hours as needed. (Patient not taking: Reported on 12/4/2024) 60 tablet 0    Naloxone HCl 4 MG/0.1ML Nasal Liquid 4 mg by Nasal route. (Patient not taking: Reported on 12/4/2024)      butalbital-acetaminophen-caffeine -40 MG Oral Tab Take 1 tablet by mouth every 4 (four) hours as needed for Headaches. (Patient not taking: Reported on 12/4/2024) 20 tablet 0    diazePAM 5 MG Oral Tab Take 1 tablet (5 mg total) by mouth every 6 (six) hours as needed (to be given if spasms not relieved by methocarbamol). (Patient not taking: Reported on 12/4/2024) 30 tablet 0    docusate sodium 100 MG Oral Cap Take 100 mg by mouth 2 (two) times daily. (Patient not taking: Reported on 12/4/2024) 30 capsule 0    ondansetron 4 MG Oral Tablet Dispersible Take 1 tablet (4 mg total)  by mouth every 6 (six) hours as needed for Nausea. (Patient not taking: Reported on 2024) 30 tablet 0       Allergies:  Allergies[1]    Gyn:  Menarche: 12  Period Cycle (Days): 28  Period Duration (Days): 7  Period Flow: Heavy, moderate then light  Pap Date: 23  Pap Result Notes: Neg  Follow Up Recommendation:     OB Hx:  OB History    Para Term  AB Living   6 4 4   2 4   SAB IAB Ectopic Multiple Live Births   2       4      # Outcome Date GA Lbr Alex/2nd Weight Sex Type Anes PTL Lv   6 SAB 2024     SAB      5 SAB 22     SAB      4 Term 22 40w0d  6 lb 9.5 oz (2.99 kg) F CS-LTranv Spinal N DANIA   3 Term 17 41w0d 16:16 / 00:01 7 lb 4.2 oz (3.295 kg) F NORMAL SPONT EPI N DANIA      Birth Comments: No problems. No prenatal complications. Mom in no meds   2 Term 10/06/15 40w5d 04:05 6 lb 13.7 oz (3.11 kg) F Vag-Spont None N DANIA   1 Term 13   8 lb 1 oz (3.657 kg) F Vag-Spont EPI N DANIA         ROS:     10 point ROS completed and was negative, except for pertinent positive and negatives stated in the HPI.    PHYSICAL EXAM:   /82   Ht 67\"   Wt (!) 329 lb (149.2 kg)   LMP 2024   Breastfeeding No   BMI 51.53 kg/m²      Wt Readings from Last 6 Encounters:   24 (!) 329 lb (149.2 kg)   10/24/24 (!) 310 lb (140.6 kg)   24 (!) 310 lb (140.6 kg)   24 299 lb 13.2 oz (136 kg)   24 299 lb 13.2 oz (136 kg)   07/15/24 300 lb (136.1 kg)        Gen:  Oriented, in no acute distress  Abdomen: no scars, scaphoid, benign without peritoneal signs, rebound tenderness,   guarding, or masses    UCG: negative     ASSESSMENT/PLAN:     1. Miscarriage (HCC)    2. Encounter for other contraceptive management      Permanency, failure rate and regret discussed.  Along with risks and complications.     Meds This Visit:  Requested Prescriptions      No prescriptions requested or ordered in this encounter       Imaging & Referrals:  None     Return in about 4 weeks  (around 1/1/2025) for Post. Op..    PA done for Lap TL done      Denton Torres MD  12/4/2024  2:45 PM         This note was created by [employer] voice recognition. Errors in content may be related to improper recognition by the system; efforts to review and correct have been done but errors may still exist. Please contact me with any questions.    Note to patient and family   The 21st Century Cures Act makes medical notes available to patients in the interest of transparency.  However, please be advised that this is a medical document.  It is intended as [id number]-[id number] communication.  It is written and medical language may contain abbreviations or verbiage that are technical and unfamiliar.  It may appear blunt or direct.  Medical documents are intended to carry relevant information, facts as evident, and the clinical opinion of the practitioner.           [1] No Known Allergies

## 2024-12-05 ENCOUNTER — HOSPITAL ENCOUNTER (OUTPATIENT)
Dept: GENERAL RADIOLOGY | Age: 29
Discharge: HOME | End: 2024-12-05
Attending: FAMILY MEDICINE
Payer: COMMERCIAL

## 2024-12-05 ENCOUNTER — MED REC SCAN ONLY (OUTPATIENT)
Facility: CLINIC | Age: 29
End: 2024-12-05

## 2024-12-05 ENCOUNTER — OFFICE VISIT (OUTPATIENT)
Dept: ORTHOPEDICS CLINIC | Facility: CLINIC | Age: 29
End: 2024-12-05
Payer: COMMERCIAL

## 2024-12-05 VITALS — HEIGHT: 67 IN | WEIGHT: 293 LBS | BODY MASS INDEX: 45.99 KG/M2

## 2024-12-05 DIAGNOSIS — M25.511 RIGHT SHOULDER PAIN, UNSPECIFIED CHRONICITY: ICD-10-CM

## 2024-12-05 DIAGNOSIS — M89.511 OSTEOLYSIS OF ACROMIAL END OF RIGHT CLAVICLE: Primary | ICD-10-CM

## 2024-12-05 DIAGNOSIS — M19.019 AC JOINT ARTHROPATHY: ICD-10-CM

## 2024-12-05 DIAGNOSIS — M75.101 ROTATOR CUFF SYNDROME OF RIGHT SHOULDER: ICD-10-CM

## 2024-12-05 PROCEDURE — 73030 X-RAY EXAM OF SHOULDER: CPT | Performed by: FAMILY MEDICINE

## 2024-12-05 PROCEDURE — 99204 OFFICE O/P NEW MOD 45 MIN: CPT | Performed by: FAMILY MEDICINE

## 2024-12-05 RX ORDER — MELOXICAM 15 MG/1
15 TABLET ORAL DAILY
Qty: 30 TABLET | Refills: 0 | Status: SHIPPED | OUTPATIENT
Start: 2024-12-05

## 2024-12-06 NOTE — H&P
Sports Medicine Clinic Note    Subjective:    Chief Complaint: Right shoulder pain    Date of Injury: Approximately three months ago    History: 29-year-old female presenting with chronic right shoulder pain, initially noticed three months ago upon waking without a specific inciting trauma. The patient describes anterior shoulder pain radiating down her arm, worsened by repetitive use activities such as lifting, pushing, pulling, and working on ladders. Symptoms are associated with occasional popping sensations but no significant mechanical symptoms such as locking or catching. Denies previous shoulder surgeries. Reports challenges in accessing physical therapy due to work and travel constraints.    Objective:    Right Shoulder Examination:    Inspection: No visible swelling, erythema, or deformity noted.  Palpation: Tenderness localized over the acromioclavicular joint. No tenderness in the bicipital groove or other areas.  Range of Motion: Full range of motion with pain noted during active forward flexion and abduction above 90 degrees.  Neurovascular: Sensation intact to light touch in axillary, radial, ulnar, and median nerve distributions. Radial pulse palpable with brisk capillary refill.  Special Tests: Positive Webb-Enmanuel and Neer tests. Negative drop arm and empty can tests.    Diagnostic Tests:    X-ray Right Shoulder: Irregularity and lucencies adjacent to the acromioclavicular joint suggestive of osteolysis. Glenohumeral joint preserved. No dislocation or fracture.  MRI Right Shoulder (11/27/2024):  Small rim rent tear of the supraspinatus insertion without significant atrophy.  Capsular hypertrophy with fluid, erosive, and cystic changes in the acromioclavicular joint consistent with arthropathy, likely due to repetitive use.  No labral tear, significant effusion, or loose bodies.    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  Small rim rent tear of the supraspinatus tendon.    Plan:    Additional Workup: None indicated at this time.  Therapy: The patient to seek physical therapy closer to her home for targeted rehabilitation, focusing on reducing pain and improving shoulder mechanics.  Medications: Prescribed meloxicam for inflammation and pain management.  Bracing/Casting: None required at this time.  Procedures: Injection therapy discussed but deferred by the patient.  Activity Recommendations: Advised to modify activities, avoiding overhead lifting and other aggravating movements to reduce strain on the acromioclavicular joint.    Follow-Up: Tentatively scheduled in 4 to 6 weeks for reassessment and to evaluate response to treatment. Patient advised to return sooner if symptoms worsen.      Gonzalo Kelley DO, CAQSM   Primary Care Sports Medicine

## 2024-12-16 ENCOUNTER — PATIENT MESSAGE (OUTPATIENT)
Facility: CLINIC | Age: 29
End: 2024-12-16

## 2025-01-02 ENCOUNTER — OFFICE VISIT (OUTPATIENT)
Dept: GASTROENTEROLOGY | Facility: CLINIC | Age: 30
End: 2025-01-02
Payer: COMMERCIAL

## 2025-01-02 ENCOUNTER — PATIENT MESSAGE (OUTPATIENT)
Dept: SURGERY | Facility: CLINIC | Age: 30
End: 2025-01-02

## 2025-01-02 ENCOUNTER — TELEPHONE (OUTPATIENT)
Dept: GASTROENTEROLOGY | Facility: CLINIC | Age: 30
End: 2025-01-02

## 2025-01-02 VITALS
SYSTOLIC BLOOD PRESSURE: 124 MMHG | WEIGHT: 293 LBS | DIASTOLIC BLOOD PRESSURE: 86 MMHG | HEIGHT: 67 IN | BODY MASS INDEX: 45.99 KG/M2

## 2025-01-02 DIAGNOSIS — R13.10 DYSPHAGIA, UNSPECIFIED TYPE: Primary | ICD-10-CM

## 2025-01-02 DIAGNOSIS — R19.8 DEFECATION SYMPTOM: ICD-10-CM

## 2025-01-02 DIAGNOSIS — K62.5 RECTAL BLEEDING: ICD-10-CM

## 2025-01-02 DIAGNOSIS — R19.4 ALTERED BOWEL HABITS: ICD-10-CM

## 2025-01-02 PROCEDURE — 99204 OFFICE O/P NEW MOD 45 MIN: CPT | Performed by: INTERNAL MEDICINE

## 2025-01-02 NOTE — TELEPHONE ENCOUNTER
Scheduled for:  Colonoscopy 47571 & EGD 88603  Provider Name:  Dr. Beltre  Date:  2/10/2025  Location:  [facility]  Sedation:  MAC  Time:  9:10 am - Patient is aware he/she will receive a phone call the day before with the arrival time.  Prep:  Golytely  Meds/Allergies Reconciled?:  Physician reviewed  Diagnosis with codes:  Dysphagia R13.10; Altered bowel habits R19.4; Defecation symptom R19.8; Rectal bleeding K62.5  Was patient informed to call insurance with codes (Y/N):  Yes  Referral sent?:  Referral was sent at the time of electronic surgical scheduling.  [facility] or [facility] notified?:  I sent an electronic request to Endo Scheduling and received a confirmation today.    Medication Orders:  [facility] message sent to pt about weight loss medications & holding prior to procedure.   Misc Orders:  N/A     Further instructions given by staff:  Prep instructions were given to pt in the office, pt verbalized understanding.

## 2025-01-02 NOTE — H&P
[facility] - Gastroenterology                                                                                                  Clinic History and Physical       Chief Complaint   Patient presents with    Consult     Constipation; blood in stool; abdomen pains; issues with swallowing but has gotten a little better     HPI:   Be Lynda Bueno is a 29 year old woman with history of BMI 51, multiple listed musculoskeletal problems, spine surgery, , tonsillectomy, tobacco use, depression, asthma here regarding issues with bowel movements and blood in the stool and trouble swallowing    Says over the last 1 to 2 years has been having lots of gastrointestinal issues and symptoms.  Describes issues with swallowing were feels like she cannot swallow solid foods.  Does not happen with liquids not necessarily feeling like things get stuck.  Also notes some changes in her voice.  Has not seen an ENT.  Describes lower central abdominal cramping sometimes associated with bowel movements as well as gastrointestinal symptoms including abdominal discomfort and right upper flank pain.  Says she also has trouble with evacuation feeling like she is not completely evacuating.  Has to sometimes digitally disimpact.  Has tried using suppositories and enemas which have not helped.  Tried using a stool softener results which has not helped.  Does have back pain which she uses an NSAID and gabapentin.  Used hydrocodone in the past but no longer on this.  Does note frequent rectal bleeding over this last year which is worsened.  Did see gastrointestinal provider in the last few months but not scheduled for a colonoscopy and upper endoscopy until 2025.  No specific family history of colorectal cancer.     History, Medications, Allergies, ROS:      Past Medical History:    Arthritis    Asthma (HCC)    Back problem     Depression    Mental disorder    Muscle weakness    RIGHT LEG      Past Surgical History:   Procedure Laterality Date    Back surgery  2024    RIGHT LUMBAR 4-LUMBAR 5, LUMBAR 5-SACRAL 1 MICRODISCECTOMY          Tonsillectomy        Family Hx:   Family History   Problem Relation Age of Onset    Depression Father     Arthritis Father     Asthma Father     Arthritis Mother     Depression Mother     Hypertension Maternal Grandfather     Other (brain and lung cancer) Paternal Grandmother 50 - 59    Hypertension Paternal Grandfather     Hypertension Brother     Diabetes Brother     Depression Brother     Brain Cancer Cousin     Other (mets) Paternal Aunt         late 30s      Social History:   Social History     Socioeconomic History    Marital status: Single   Tobacco Use    Smoking status: Every Day     Current packs/day: 1.00     Types: Cigarettes     Passive exposure: Current    Smokeless tobacco: Never   Vaping Use    Vaping status: Never Used   Substance and Sexual Activity    Alcohol use: Not Currently    Drug use: Never    Sexual activity: Yes     Partners: Male     Birth control/protection: Condom, Rhythm     Social Drivers of Health     Food Insecurity: Food Insecurity Present (2024)    Food Insecurity     Food Insecurity: Sometimes true   Transportation Needs: No Transportation Needs (2024)    Transportation Needs     Lack of Transportation: No   Housing Stability: Low Risk  (2024)    Housing Stability     Housing Instability: No        Medications (Active prior to today's visit):  Current Outpatient Medications   Medication Sig Dispense Refill    Meloxicam 15 MG Oral Tab Take 1 tablet (15 mg total) by mouth daily. Take once daily for 4 weeks 30 tablet 0    Naproxen Sodium (ALEVE OR) Take 600 mg by mouth as needed.      gabapentin 400 MG Oral Cap Take 1 capsule (400 mg total) by mouth 3 (three) times daily.      hydrOXYzine 25 MG Oral Tab Take 1 tablet (25 mg total) by mouth every  8 (eight) hours as needed. (Patient not taking: Reported on 12/5/2024)      GAVILYTE-G 236 g Oral Recon Soln  (Patient not taking: Reported on 12/5/2024)      HYDROcodone-acetaminophen 5-325 MG Oral Tab Take 1-2 tablets by mouth every 6 (six) hours as needed for Pain. (Patient not taking: Reported on 12/5/2024) 40 tablet 0    HYDROcodone-acetaminophen 5-325 MG Oral Tab Take 1-2 tablets by mouth every 4 (four) hours as needed. (Patient not taking: Reported on 12/5/2024) 60 tablet 0    Naloxone HCl 4 MG/0.1ML Nasal Liquid 4 mg by Nasal route. (Patient not taking: Reported on 10/24/2024)      albuterol 108 (90 Base) MCG/ACT Inhalation Aero Soln Inhale 2 puffs into the lungs every 4 (four) hours as needed for Wheezing.      butalbital-acetaminophen-caffeine -40 MG Oral Tab Take 1 tablet by mouth every 4 (four) hours as needed for Headaches. (Patient not taking: Reported on 12/5/2024) 20 tablet 0    diazePAM 5 MG Oral Tab Take 1 tablet (5 mg total) by mouth every 6 (six) hours as needed (to be given if spasms not relieved by methocarbamol). (Patient not taking: Reported on 12/5/2024) 30 tablet 0    docusate sodium 100 MG Oral Cap Take 100 mg by mouth 2 (two) times daily. (Patient not taking: Reported on 10/24/2024) 30 capsule 0    ondansetron 4 MG Oral Tablet Dispersible Take 1 tablet (4 mg total) by mouth every 6 (six) hours as needed for Nausea. (Patient not taking: Reported on 12/5/2024) 30 tablet 0    buPROPion  MG Oral Tablet 12 Hr Take 1 tablet (150 mg total) by mouth 2 (two) times daily.         Allergies:  Allergies[1]    ROS:   Systems were reviewed and were negative except as noted in the HPI    PHYSICAL EXAM:   Blood pressure 124/86, height 5' 7\" (1.702 m), weight (!) 326 lb (147.9 kg), last menstrual period 09/27/2024, not currently breastfeeding.    General:awake, cooperative, no acute distress  HEENT: EOMI, no scleral icterus, MMM; oral pharnyx is without exudates or lesions  Neck: no  lymphadenopathy; thyroid is not enlarged and without nodules  CV: RRR  Resp: non-labored breathing  Abd: soft, non-tender, non-distended  Ext: no lower extremity swelling  Neuro: Alert, Oriented X 3  Skin: no rashes, bruises  Psych: normal affect    Labs/Imaging:     Reviewed as noted in the HPI and A/P    ASSESSMENT/PLAN:   Be Bueno is a 29 year old woman with history of BMI 51, multiple listed musculoskeletal problems, spine surgery, , tonsillectomy, tobacco use, depression, asthma here regarding issues with bowel movements and blood in the stool and trouble swallowing    Review of the chart notes gastrointestinal office visit evaluation in 2024 at an outside facility with a nurse practitioner regarding report of blood in her stool, right lower quadrant pain and gas, dysphagia.  The note indicates ordered stool and blood testing as well as a CT scan and upper endoscopy and colonoscopy.  The report of this CT scan of the abdomen and pelvis from 2024 is noted to be unrevealing.  CBCs from July and 2024 show a mild anemia.  Serologies from 2024 are noted and reviewed including unremarkable CRP, liver enzymes, TSH, BMP, serologies for celiac disease    Discussed recommendations for further evaluation of her symptoms with upper endoscopy and colonoscopy.  Depending on these consideration of ENT evaluation and swallow study and/or esophagram. Also consideration of anorectal manometry and/or pelvic floor PT.    Recommend:  -EGD and colonoscopy with MAC at the hospital with split 4L PEG  -trial of a regular fiber supplement    EGD Consent: I have discussed the risks, benefits, and alternatives to EGD with the patient [who demonstrated understanding], including but not limited to the risks of bleeding, infection, pain, perforation as well as the cardiopulmonary risks of anesthesia all leading to prolonged hospital stay or surgical intervention. All  questions were answered to the patient’s satisfaction. The patient elected to proceed with EGD with intervention [i.e. Biopsy, dilatation, control of bleeding, etc.] as indicated.    Colonoscopy consent: I have discussed the risks, benefits, and alternatives (including stool testing) to colonoscopy with the patient [who demonstrated understanding], including but not limited to the risks of bleeding, infection, pain, as well as the risks of anesthesia and perforation all leading to prolonged hospitalization, surgical intervention, or could be life threatening. I also specifically mentioned the risk of missed lesions/polyps. All questions were answered to the patient’s satisfaction. The patient signed informed consent and elected to proceed with colonoscopy with intervention [i.e. polypectomy, biopsy, control of bleeding, etc.] as indicated. I also discussed a ride home from a family member of friend is required and driving after the procedure with sedation is not safe or recommended.    Orders This Visit:  No orders of the defined types were placed in this encounter.    Meds This Visit:  Requested Prescriptions      No prescriptions requested or ordered in this encounter     Imaging & Referrals:  None     Garry Beltre MD  [facility] - Gastroenterology  1/2/2025             [1] No Known Allergies

## 2025-01-02 NOTE — PATIENT INSTRUCTIONS
1. Schedule EGD and colonoscopy with MAC at the hospital    2.  bowel prep from pharmacy (split colyte) - please read attached/given instructions very carefully     3. Medication     Continue all medications for procedure    4. Read all bowel prep instructions carefully    5. AVOID seeds, nuts, popcorn, raw fruits and vegetables (cooked is okay) for 2-3 days before procedure    >>>Please note: if you were prescribed a bowel prep and it is too expensive or not covered by insurance, it is okay to substitute Trilyte or Golytely (or any similar generic prep). This can be done by notifying the pharmacy or calling our office.     6. Fiber supplementation - You should get 20 to 35 grams of fiber a day. Not all fibers are the same. I recommend starting with a psyllium fiber like metamucil. If not helping, try Citrucel and Fibercon are good ones that can be purchased over the counter as well to try.

## 2025-01-03 NOTE — TELEPHONE ENCOUNTER
Message below noted.    Noted External PT order from 12/05/24 in chart.    Advised patient of referral location and advised we may fax the order. We just require an appropriate fax and phone number.

## 2025-02-10 ENCOUNTER — ANESTHESIA EVENT (OUTPATIENT)
Dept: ENDOSCOPY | Facility: HOSPITAL | Age: 30
End: 2025-02-10
Payer: COMMERCIAL

## 2025-02-10 ENCOUNTER — HOSPITAL ENCOUNTER (OUTPATIENT)
Facility: HOSPITAL | Age: 30
Setting detail: HOSPITAL OUTPATIENT SURGERY
Discharge: HOME | End: 2025-02-10
Attending: INTERNAL MEDICINE | Admitting: INTERNAL MEDICINE
Payer: COMMERCIAL

## 2025-02-10 ENCOUNTER — ANESTHESIA (OUTPATIENT)
Dept: ENDOSCOPY | Facility: HOSPITAL | Age: 30
End: 2025-02-10
Payer: COMMERCIAL

## 2025-02-10 VITALS
HEIGHT: 67 IN | DIASTOLIC BLOOD PRESSURE: 95 MMHG | WEIGHT: 293 LBS | TEMPERATURE: 99 F | SYSTOLIC BLOOD PRESSURE: 132 MMHG | HEART RATE: 87 BPM | OXYGEN SATURATION: 99 % | RESPIRATION RATE: 21 BRPM | BODY MASS INDEX: 45.99 KG/M2

## 2025-02-10 DIAGNOSIS — R19.8 DEFECATION SYMPTOM: ICD-10-CM

## 2025-02-10 DIAGNOSIS — K62.5 RECTAL BLEEDING: ICD-10-CM

## 2025-02-10 DIAGNOSIS — R13.10 DYSPHAGIA, UNSPECIFIED TYPE: ICD-10-CM

## 2025-02-10 DIAGNOSIS — R19.4 ALTERED BOWEL HABITS: ICD-10-CM

## 2025-02-10 LAB — B-HCG UR QL: NEGATIVE

## 2025-02-10 PROCEDURE — 43239 EGD BIOPSY SINGLE/MULTIPLE: CPT | Performed by: INTERNAL MEDICINE

## 2025-02-10 PROCEDURE — 45380 COLONOSCOPY AND BIOPSY: CPT | Performed by: INTERNAL MEDICINE

## 2025-02-10 PROCEDURE — 0DB28ZX EXCISION OF MIDDLE ESOPHAGUS, VIA NATURAL OR ARTIFICIAL OPENING ENDOSCOPIC, DIAGNOSTIC: ICD-10-PCS | Performed by: INTERNAL MEDICINE

## 2025-02-10 PROCEDURE — 0DB68ZX EXCISION OF STOMACH, VIA NATURAL OR ARTIFICIAL OPENING ENDOSCOPIC, DIAGNOSTIC: ICD-10-PCS | Performed by: INTERNAL MEDICINE

## 2025-02-10 PROCEDURE — 0DB38ZX EXCISION OF LOWER ESOPHAGUS, VIA NATURAL OR ARTIFICIAL OPENING ENDOSCOPIC, DIAGNOSTIC: ICD-10-PCS | Performed by: INTERNAL MEDICINE

## 2025-02-10 PROCEDURE — 0DB98ZX EXCISION OF DUODENUM, VIA NATURAL OR ARTIFICIAL OPENING ENDOSCOPIC, DIAGNOSTIC: ICD-10-PCS | Performed by: INTERNAL MEDICINE

## 2025-02-10 PROCEDURE — 0DBE8ZX EXCISION OF LARGE INTESTINE, VIA NATURAL OR ARTIFICIAL OPENING ENDOSCOPIC, DIAGNOSTIC: ICD-10-PCS | Performed by: INTERNAL MEDICINE

## 2025-02-10 RX ORDER — LIDOCAINE HYDROCHLORIDE 10 MG/ML
INJECTION, SOLUTION EPIDURAL; INFILTRATION; INTRACAUDAL; PERINEURAL AS NEEDED
Status: DISCONTINUED | OUTPATIENT
Start: 2025-02-10 | End: 2025-02-10 | Stop reason: SURG

## 2025-02-10 RX ORDER — NALOXONE HYDROCHLORIDE 0.4 MG/ML
0.08 INJECTION, SOLUTION INTRAMUSCULAR; INTRAVENOUS; SUBCUTANEOUS ONCE AS NEEDED
Status: DISCONTINUED | OUTPATIENT
Start: 2025-02-10 | End: 2025-02-10

## 2025-02-10 RX ORDER — GLYCOPYRROLATE 0.2 MG/ML
INJECTION, SOLUTION INTRAMUSCULAR; INTRAVENOUS AS NEEDED
Status: DISCONTINUED | OUTPATIENT
Start: 2025-02-10 | End: 2025-02-10 | Stop reason: SURG

## 2025-02-10 RX ORDER — SODIUM CHLORIDE, SODIUM LACTATE, POTASSIUM CHLORIDE, CALCIUM CHLORIDE 600; 310; 30; 20 MG/100ML; MG/100ML; MG/100ML; MG/100ML
INJECTION, SOLUTION INTRAVENOUS CONTINUOUS
Status: DISCONTINUED | OUTPATIENT
Start: 2025-02-10 | End: 2025-02-10

## 2025-02-10 RX ORDER — PHENTERMINE HYDROCHLORIDE 15 MG/1
15 CAPSULE ORAL EVERY MORNING
COMMUNITY

## 2025-02-10 RX ADMIN — LIDOCAINE HYDROCHLORIDE 50 MG: 10 INJECTION, SOLUTION EPIDURAL; INFILTRATION; INTRACAUDAL; PERINEURAL at 13:20:00

## 2025-02-10 RX ADMIN — GLYCOPYRROLATE 0.2 MG: 0.2 INJECTION, SOLUTION INTRAMUSCULAR; INTRAVENOUS at 13:21:00

## 2025-02-10 RX ADMIN — SODIUM CHLORIDE, SODIUM LACTATE, POTASSIUM CHLORIDE, CALCIUM CHLORIDE: 600; 310; 30; 20 INJECTION, SOLUTION INTRAVENOUS at 13:18:00

## 2025-02-10 NOTE — H&P
History & Physical Examination    Patient Name: Be Bueno  MRN: [id number]  CSN: [id number]  YOB: 1995    Diagnosis: dysphagia, RLQ pain, altered bowel habits, rectal bleeding    Prescriptions Prior to Admission[1]  Current Facility-Administered Medications   Medication Dose Route Frequency    lactated ringers infusion   Intravenous Continuous       Allergies: Allergies[2]    Past Medical History:    Anesthesia complication    nausea    Anxiety state    Arthritis    Asthma (HCC)    Back problem    Depression    History of blood transfusion    Mental disorder    Muscle weakness    RIGHT LEG    Neuropathy    PONV (postoperative nausea and vomiting)    Problems with swallowing     Past Surgical History:   Procedure Laterality Date    Back surgery  2024    RIGHT LUMBAR 4-LUMBAR 5, LUMBAR 5-SACRAL 1 MICRODISCECTOMY          Tonsillectomy       Family History   Problem Relation Age of Onset    Depression Father     Arthritis Father     Asthma Father     Arthritis Mother     Depression Mother     Hypertension Maternal Grandfather     Other (brain and lung cancer) Paternal Grandmother 50 - 59    Hypertension Paternal Grandfather     Hypertension Brother     Diabetes Brother     Depression Brother     Brain Cancer Cousin     Other (mets) Paternal Aunt         late 30s     Social History     Tobacco Use    Smoking status: Every Day     Current packs/day: 1.00     Types: Cigarettes     Passive exposure: Current    Smokeless tobacco: Never   Substance Use Topics    Alcohol use: Not Currently       SYSTEM Check if Physical Exam is Normal If not normal, please explain:   HEENT [ X]    NECK  [ X]    HEART [ X]    LUNGS [ X]    ABDOMEN [ X]    EXTREMITIES [ X]      General:awake, cooperative, no acute distress  HEENT: EOMI, no scleral icterus, MMM; oral pharnyx is without exudates or lesions  Neck: no lymphadenopathy; thyroid is not enlarged and without nodules  CV: RRR  Resp:  non-labored breathing  Abd: soft, non-tender, non-distended  Ext: no lower extremity swelling  Neuro: Alert, Oriented X 3  Skin: no rashes, bruises  Psych: normal affect  I have discussed the risks and benefits and alternatives of the procedure with the patient/family.  They understand and agreed to proceed with plan of care.   Garry Beltre MD  [facility] - Gastroenterology  2/10/2025  12:59 PM                   [1]   Medications Prior to Admission   Medication Sig Dispense Refill Last Dose/Taking    Naproxen Sodium (ALEVE OR) Take 600 mg by mouth as needed.   2025    gabapentin 400 MG Oral Cap Take 1 capsule (400 mg total) by mouth 3 (three) times daily.   2025    hydrOXYzine 25 MG Oral Tab Take 1 tablet (25 mg total) by mouth every 8 (eight) hours as needed.   1/15/2025    albuterol 108 (90 Base) MCG/ACT Inhalation Aero Soln Inhale 2 puffs into the lungs every 4 (four) hours as needed for Wheezing.   Taking As Needed    buPROPion  MG Oral Tablet 12 Hr Take 1 tablet (150 mg total) by mouth 2 (two) times daily.   2025 at 11:00 AM    Phentermine HCl 15 MG Oral Cap Take 1 capsule (15 mg total) by mouth every morning.   2025    [] PEG 3350-KCl-NaBcb-NaCl-NaSulf (Colyte with Flavor Packs) 240 GM Oral Solution Reconstituted Take 4,000 mL by mouth one time for 1 dose. (Patient not taking: Reported on 2/3/2025) 4000 mL 0 Not Taking    butalbital-acetaminophen-caffeine -40 MG Oral Tab Take 1 tablet by mouth every 4 (four) hours as needed for Headaches. (Patient not taking: Reported on 2/10/2025) 20 tablet 0 Not Taking   [2] No Known Allergies

## 2025-02-10 NOTE — OPERATIVE REPORT
Esophagogastroduodenoscopy (EGD) & Colonoscopy Report    Be Bueno     1995 Age 29 year old   PCP Leonel West MD Endoscopist Garry Beltre MD     Date of procedure: 02/10/25    Procedure: EGD w/ biopsy & Colonoscopy w/ biopsy    Pre-operative diagnosis:  dysphagia, RLQ pain, altered bowel habits, rectal bleeding     Post-operative diagnosis: see impression    Sedation: monitored anesthesia care (MAC)    Consent: We discussed the risks/benefits and alternatives to this procedure, as well as the planned sedation. Informed consent was obtained from the patient after the risks of the procedure were discussed, including but not limited to bleeding, perforation, aspiration, infection, or possibility of a missed lesion as well as the risks of anesthesia including but not limited to cardiopulmonary complications. The patient signed informed consent and elected to proceed with EGD/Colonoscopy with intervention [i.e. Biopsy, control of bleeding, dilatation, polypectomy, endoscopic mucosal resection, etc.] as indicated.    EGD procedure: The patient was placed in the left lateral decubitus position and begun on continuous blood pressure pulse oximetry and EKG monitoring and this was maintained throughout the procedure. Once an adequate level of sedation was obtained a bite block was placed. Then the lubricated tip of the [id number] diagnostic video upper endoscope was carefully inserted and advanced using direct visualization into the posterior pharynx and ultimately into the esophagus. Air was then withdrawn and the endoscope was removed.    Colonoscopy procedure: Once an adequate level of sedation was obtained a digital rectal exam was completed, with normal tone and no masses palpated. Then the lubricated tip of the [id number] diagnostic video colonoscope was carefully inserted and advanced without difficulty to the cecum using the air insufflation technique (only Co2 was used  for insufflation). The cecum was identified by localizing the trifold, the appendix and the ileocecal valve. Withdrawal was begun with thorough washing and careful examination of the colonic walls and folds. The ascending colon was viewed twice in the forward view. Photodocumentation was obtained. The bowel prep was good. Views of the colon were good with washing. Withdrawal time was 12 minutes.    Air was then withdrawn and the endoscope was removed. The patient tolerated the procedure well. There were no immediate postoperative complications. The patient’s vital signs were monitored throughout the procedure and remained stable.    Estimated blood loss: insignificant    Specimens collected:  esophageal, gastric, duodenal and colon biopsies    Complications: none    EGD findings:      1. Esophagus: The squamocolumnar junction was noted and appeared regular. There were a couple short/small linear erosions in the distal esophagus suggestive of LA grade A/B reflux esophagitis. Multiple cold forceps biopsies were obtained in the distal esophagus at the erosions and randomly and then additionally randomly in the mid/proximal esophagus. The esophageal mucosa appeared unremarkable otherwise.  2. Stomach: There was a small sliding hiatal hernia. The stomach distended normally. Normal rugal folds were seen. The pylorus was patent. The gastric mucosa appeared unremarkable. Multiple cold forceps biopsies were obtained randomly. Retroflexion revealed a normal fundus and cardia.   3. Duodenum: The duodenal mucosa appeared normal in the 1st and 2nd portion of the duodenum. Multiple cold forceps biopsies were obtained randomly.    Colonoscopy findings:  Terminal ileum: normal mucosa    Multiple cold forceps biopsies were obtained randomly throughout the colon    Cecum: normal mucosa and vascular pattern    Ascending colon: normal mucosa and vascular pattern    Transverse colon: normal mucosa and vascular pattern    Descending  colon: normal mucosa and vascular pattern    Sigmoid colon: normal mucosa and vascular pattern    Rectum: retroflexed view showed small non-bleeding hemorrhoids. Otherwise, normal mucosa and vascular pattern.    Impression:  1. Mild distal esophagitis  2. A small sliding hiatal hernia  3. Small hemorrhoids  4. Otherwise, unremarkable colonoscopy and upper endoscopy    Recommend:  1. Await pathology.   2. Consider a proton pump inhibitor   3. Fiber supplementation  4. Follow up with your primary care physician on a routine basis    >>>If biopsies were performed and you have not received your pathology results either by phone or letter within 2 weeks, please call our office at [phone].    MD Kyle [facility] - Gastroenterology  2/10/2025

## 2025-02-10 NOTE — ANESTHESIA PREPROCEDURE EVALUATION
Anesthesia PreOp Note    HPI:     Be Bueno is a 29 year old female who presents for preoperative consultation requested by: Garry Beltre MD    Date of Surgery: 2/10/2025    Procedure(s):  COLONOSCOPY  ESOPHAGOGASTRODUODENOSCOPY (EGD)  Indication: Dysphagia, unspecified type/ Altered bowel habits/ Defecation symptom / Rectal bleeding    Relevant Problems   No relevant active problems       NPO:  Last Liquid Consumption Date: 02/10/25  Last Liquid Consumption Time: 0900  Last Solid Consumption Date: 25  Last Solid Consumption Time: 2300  Last Liquid Consumption Date: 02/10/25          History Review:  Patient Active Problem List    Diagnosis Date Noted    Intractable back pain 2024    Acute right-sided low back pain with right-sided sciatica 2024    Chronic midline low back pain with left-sided sciatica 2023    Depression 2023    Lumbar radiculopathy 2023    Labor, false ([facility]) 2022    Multiple joint pain 2017    Morbid obesity with BMI of 45.0-49.9, adult ([facility]) 2016    Wheezing 2016       Past Medical History:    Anesthesia complication    nausea    Anxiety state    Arthritis    Asthma ([facility])    Back problem    Depression    History of blood transfusion    Mental disorder    Muscle weakness    RIGHT LEG    Neuropathy    PONV (postoperative nausea and vomiting)    Problems with swallowing       Past Surgical History:   Procedure Laterality Date    Back surgery  2024    RIGHT LUMBAR 4-LUMBAR 5, LUMBAR 5-SACRAL 1 MICRODISCECTOMY          Tonsillectomy         Prescriptions Prior to Admission[1]  Current Medications and Prescriptions Ordered in Epic[2]    Allergies[3]    Family History   Problem Relation Age of Onset    Depression Father     Arthritis Father     Asthma Father     Arthritis Mother     Depression Mother     Hypertension Maternal Grandfather     Other (brain and lung cancer) Paternal Grandmother 50 - 59     Hypertension Paternal Grandfather     Hypertension Brother     Diabetes Brother     Depression Brother     Brain Cancer Cousin     Other (mets) Paternal Aunt         late 30s     Social History     Socioeconomic History    Marital status: Single   Tobacco Use    Smoking status: Every Day     Current packs/day: 1.00     Types: Cigarettes     Passive exposure: Current    Smokeless tobacco: Never   Vaping Use    Vaping status: Never Used   Substance and Sexual Activity    Alcohol use: Not Currently    Drug use: Never    Sexual activity: Yes     Partners: Male     Birth control/protection: Condom, Rhythm       Available pre-op labs reviewed.  Lab Results   Component Value Date    URINEPREG Negative 02/10/2025             Vital Signs:  Body mass index is 51.69 kg/m².   height is 1.702 m (5' 7\") and weight is 149.7 kg (330 lb) (abnormal). Her blood pressure is 131/59 and her pulse is 71. Her respiration is 18 and oxygen saturation is 95%.   Vitals:    02/03/25 1436 02/10/25 1139 02/10/25 1140   BP:   131/59   Pulse:   71   Resp:   18   SpO2:   95%   Weight: (!) 149.7 kg (330 lb) (!) 149.7 kg (330 lb)    Height: 1.702 m (5' 7\") 1.702 m (5' 7\")         Anesthesia Evaluation     Patient summary reviewed and Nursing notes reviewed    History of anesthetic complications   Airway   Mallampati: III  TM distance: >3 FB  Neck ROM: full  Comment: Small mouth opening  Dental - Dentition appears grossly intact     Pulmonary - normal exam   Cardiovascular - negative ROS and normal exam    Neuro/Psych      GI/Hepatic/Renal    (+) bowel prep    Endo/Other    Abdominal                  Anesthesia Plan:   ASA:  2  Plan:   General and MAC  Informed Consent Plan and Risks Discussed With:  Patient  Discussed plan with:  Surgeon      I have informed Be Bueno and/or legal guardian or family member of the nature of the anesthetic plan, benefits, risks including possible dental damage if relevant, major complications, and any  alternative forms of anesthetic management.   All of the patient's questions were answered to the best of my ability. The patient desires the anesthetic management as planned.  Sangitahector Norton CRNA  2/10/2025 1:13 PM  Present on Admission:  **None**           [1]   Medications Prior to Admission   Medication Sig Dispense Refill Last Dose/Taking    Naproxen Sodium (ALEVE OR) Take 600 mg by mouth as needed.   2025    gabapentin 400 MG Oral Cap Take 1 capsule (400 mg total) by mouth 3 (three) times daily.   2025    hydrOXYzine 25 MG Oral Tab Take 1 tablet (25 mg total) by mouth every 8 (eight) hours as needed.   1/15/2025    albuterol 108 (90 Base) MCG/ACT Inhalation Aero Soln Inhale 2 puffs into the lungs every 4 (four) hours as needed for Wheezing.   Taking As Needed    buPROPion  MG Oral Tablet 12 Hr Take 1 tablet (150 mg total) by mouth 2 (two) times daily.   2025 at 11:00 AM    Phentermine HCl 15 MG Oral Cap Take 1 capsule (15 mg total) by mouth every morning.   2025    [] PEG 3350-KCl-NaBcb-NaCl-NaSulf (Colyte with Flavor Packs) 240 GM Oral Solution Reconstituted Take 4,000 mL by mouth one time for 1 dose. (Patient not taking: Reported on 2/3/2025) 4000 mL 0 Not Taking    butalbital-acetaminophen-caffeine -40 MG Oral Tab Take 1 tablet by mouth every 4 (four) hours as needed for Headaches. (Patient not taking: Reported on 2/10/2025) 20 tablet 0 Not Taking   [2]   Current Facility-Administered Medications Ordered in Epic   Medication Dose Route Frequency Provider Last Rate Last Admin    lactated ringers infusion   Intravenous Continuous Garry Beltre MD 20 mL/hr at 02/10/25 1206 New Bag at 02/10/25 1206     No current [facility]-ordered outpatient medications on file.   [3] No Known Allergies

## 2025-02-10 NOTE — DISCHARGE INSTRUCTIONS
Home Care Instructions for Colonoscopy and/or Gastroscopy with Sedation    Diet:  - Resume your regular diet as tolerated unless otherwise instructed.  - Start with light meals to minimize bloating.  - Do not drink alcohol today.    Medication:  - If you have questions about resuming your normal medications, please contact your Primary Care Physician.    Activities:  - Take it easy today. Do not return to work today.  - Do not drive today.  - Do not operate any machinery today (including kitchen equipment).    Colonoscopy:  - You may notice some rectal \"spotting\" (a little blood on the toilet tissue) for a day or two after the exam. This is normal.  - If you experience any rectal bleeding (not spotting), persistent tenderness or sharp severe abdominal pains, oral temperature over 100 degrees Fahrenheit, light-headedness or dizziness, or any other problems, contact your doctor.    Gastroscopy:  - You may have a sore throat for 2-3 days following the exam. This is normal. Gargling with warm salt water (1/2 tsp salt to 1 glass warm water) or using throat lozenges will help.  - If you experience any sharp pain in your neck, abdomen or chest, vomiting of blood, oral temperature over 100 degrees Fahrenheit, light-headedness or dizziness, or any other problems, contact your doctor.    **If unable to reach your doctor, please go to the [facility] Emergency Room**    - Your referring physician will receive a full report of your examination.  - If you do not hear from your doctor's office within two weeks of your biopsy, please call them for your results.    You may be able to see your laboratory results in [employer] between 4 and 7 business days.  In some cases, your physician may not have viewed the results before they are released to [employer].  If you have questions regarding your results contact the physician who ordered the test/exam by phone or via [employer] by choosing \"Ask a Medical Question.\"

## 2025-02-10 NOTE — ANESTHESIA POSTPROCEDURE EVALUATION
Patient: Be Howardade    Procedure Summary       Date: 02/10/25 Room / Location: [facility] ENDOSCOPY 01 / [facility] ENDOSCOPY    Anesthesia Start: 1318 Anesthesia Stop: 1358    Procedures:       COLONOSCOPY with biopsies      ESOPHAGOGASTRODUODENOSCOPY (EGD) with biopsies Diagnosis:       Dysphagia, unspecified type      Altered bowel habits      Defecation symptom      Rectal bleeding      (esophagitis, hemorrhoids)    Surgeons: Garry Beltre MD Anesthesiologist: Sangita Norton CRNA    Anesthesia Type: general, MAC ASA Status: 2            Anesthesia Type: general, MAC    Vitals Value Taken Time   /66 02/10/25 1357   Temp 98.6 °F (37 °C) 02/10/25 1357   Pulse 90 02/10/25 1357   Resp 17 02/10/25 1357   SpO2 97 % 02/10/25 1357   Vitals shown include unfiled device data.    [facility] AN Post Evaluation:   Patient Evaluated in PACU  Patient Participation: complete - patient participated  Level of Consciousness: sleepy but conscious  Pain Score: 0  Pain Management: adequate  Airway Patency:patent  Dental exam unchanged from preop  Yes    Nausea/Vomiting: none  Cardiovascular Status: acceptable  Respiratory Status: acceptable and room air  Postoperative Hydration acceptable      Sangita Norton CRNA  2/10/2025 1:58 PM

## (undated) DEVICE — PAD,NON-ADHERENT,3X8,STERILE,LF,1/PK: Brand: MEDLINE

## (undated) DEVICE — SOLUTION IRRIG 1000ML 0.9% NACL USP BTL

## (undated) DEVICE — GLOVE SUR 8 SENSICARE PIP WHT PWD F

## (undated) DEVICE — V2 SPECIMEN COLLECTION MANIFOLD KIT: Brand: NEPTUNE

## (undated) DEVICE — CONMED SCOPE SAVER BITE BLOCK, 20X27 MM: Brand: SCOPE SAVER

## (undated) DEVICE — SUT MCRYL 4-0 18IN PS-2 ABSRB UD 19MM 3/8 CIR

## (undated) DEVICE — SUT VCRL 0 18IN CT-1 ABSRB VLT CR L36MM 1/2

## (undated) DEVICE — 60 ML SYRINGE REGULAR TIP: Brand: MONOJECT

## (undated) DEVICE — SUT PROL 6-0 30IN BV-1 NABSRB BLU 9.3MM 3/8 C

## (undated) DEVICE — MEDI-VAC NON-CONDUCTIVE SUCTION TUBING 6MM X 1.8M (6FT.) L: Brand: CARDINAL HEALTH

## (undated) DEVICE — KIT CLEAN ENDOKIT 1.1OZ GOWNX2

## (undated) DEVICE — YANKAUER,BULB TIP,W/O VENT,RIGID,STERILE: Brand: MEDLINE

## (undated) DEVICE — ELECTRODE ES 2.75IN PTFE BLDE MOD E-Z CLN

## (undated) DEVICE — SUT VCRL 2-0 18IN ABSRB UD CR L26MM CT-2

## (undated) DEVICE — ZZ- DISC- NOSUB-SOLUTION RUBBING 4OZ 70% ISO ALC CLR

## (undated) DEVICE — SLEEVE COMPR MD KNEE LEN SGL USE KENDALL SCD

## (undated) DEVICE — MARKER SKIN PREP-RESISTANT ST: Brand: MEDLINE INDUSTRIES, INC.

## (undated) DEVICE — MEDI-VAC NON-CONDUCTIVE SUCTION TUBING: Brand: CARDINAL HEALTH

## (undated) DEVICE — 3M™ TEGADERM™ TRANSPARENT FILM DRESSING FRAME STYLE, 1626W, 4 IN X 4-3/4 IN (10 CM X 12 CM), 50/CT 4CT/CASE: Brand: 3M™ TEGADERM™

## (undated) DEVICE — Device

## (undated) DEVICE — KIT ENDO ORCAPOD 160/180/190

## (undated) DEVICE — E-Z CLEAN, NON-STICK, PTFE COATED, ELECTROSURGICAL BLADE ELECTRODE, MODIFIED EXTENDED INSULATION, 4 INCH (10.2 CM): Brand: MEGADYNE

## (undated) DEVICE — LAMINECTOMY CDS: Brand: MEDLINE INDUSTRIES, INC.

## (undated) DEVICE — SPONGE GZ 4X4IN COT 12 PLY TYP VII WVN C

## (undated) DEVICE — ELECTRODE ES L10.2CM BLDE L4IN EXT MPLR OPN

## (undated) DEVICE — GIJAW SINGLE-USE BIOPSY FORCEPS WITH NEEDLE: Brand: GIJAW

## (undated) DEVICE — Device: Brand: INTELLICART™

## (undated) DEVICE — KIT HEMSTAT MTRX 8ML PORCINE GEL HUM THROM

## (undated) DEVICE — DRAPE,TOWEL,LARGE,INVISISHIELD: Brand: MEDLINE

## (undated) DEVICE — COVER LT HNDL RIG FOR SUR CAM DISP

## (undated) DEVICE — GLOVE SUR 8 SENSICARE PI PIP GRN PWD F

## (undated) DEVICE — ZEISS MD DRAPE

## (undated) NOTE — LETTER
[facility]  [address], [place], [place]    Authorization for Surgical Operation and Procedure                               I hereby authorize Garry Beltre MD, my physician and his/her assistants (if applicable), which may include medical students, residents, and/or fellows, to perform the following surgical operation/ procedure and administer such anesthesia as may be determined necessary by my physician: Operation/Procedure name (s) COLONOSCOPY/ ESOPHAGOGASTRODUODENOSCOPY on Be Bueno   2.   I recognize that during the surgical operation/procedure, unforeseen conditions may necessitate additional or different procedures than those listed above.  I, therefore, further authorize and request that the above-named surgeon, assistants, or designees perform such procedures as are, in their judgment, necessary and desirable.    3.   My surgeon/physician has discussed prior to my surgery the potential benefits, risks and side effects of this procedure; the likelihood of achieving goals; and potential problems that might occur during recuperation.  They also discussed reasonable alternatives to the procedure, including risks, benefits, and side effects related to the alternatives and risks related to not receiving this procedure.  I have had all my questions answered and I acknowledge that no guarantee has been made as to the result that may be obtained.    4.   Should the need arise during my operation/procedure, which includes change of level of care prior to discharge, I also consent to the administration of blood and/or blood products.  Further, I understand that despite careful testing and screening of blood or blood products by collecting agencies, I may still be subject to ill effects as a result of receiving a blood transfusion and/or blood products.  The following are some, but not all, of the potential risks that can occur: fever and allergic reactions, hemolytic  reactions, transmission of diseases such as Hepatitis, AIDS and Cytomegalovirus (CMV) and fluid overload.  In the event that I wish to have an autologous transfusion of my own blood, or a directed donor transfusion, I will discuss this with my physician.  Check only if Refusing Blood or Blood Products  I understand refusal of blood or blood products as deemed necessary by my physician may have serious consequences to my condition to include possible death. I hereby assume responsibility for my refusal and release the hospital, its personnel, and my physicians from any responsibility for the consequences of my refusal.    o  Refuse   5.   I authorize the use of any specimen, organs, tissues, body parts or foreign objects that may be removed from my body during the operation/procedure for diagnosis, research or teaching purposes and their subsequent disposal by hospital authorities.  I also authorize the release of specimen test results and/or written reports to my treating physician on the hospital medical staff or other referring or consulting physicians involved in my care, at the discretion of the Pathologist or my treating physician.    6.   I consent to the photographing or videotaping of the operations or procedures to be performed, including appropriate portions of my body for medical, scientific, or educational purposes, provided my identity is not revealed by the pictures or by descriptive texts accompanying them.  If the procedure has been photographed/videotaped, the surgeon will obtain the original picture, image, videotape or CD.  The hospital will not be responsible for storage, release or maintenance of the picture, image, tape or CD.    7.   I consent to the presence of a  or observers in the operating room as deemed necessary by my physician or their designees.    8.   I recognize that in the event my procedure results in extended X-Ray/fluoroscopy time, I may develop a skin  reaction.    9. If I have a Do Not Attempt Resuscitation (DNAR) order in place, that status will be suspended while in the operating room, procedural suite, and during the recovery period unless otherwise explicitly stated by me (or a person authorized to consent on my behalf). The surgeon or my attending physician will determine when the applicable recovery period ends for purposes of reinstating the DNAR order.  10. Patients having a sterilization procedure: I understand that if the procedure is successful the results will be permanent and it will therefore be impossible for me to inseminate, conceive, or bear children.  I also understand that the procedure is intended to result in sterility, although the result has not been guaranteed.   11. I acknowledge that my physician has explained sedation/analgesia administration to me including the risk and benefits I consent to the administration of sedation/analgesia as may be necessary or desirable in the judgment of my physician.    I CERTIFY THAT I HAVE READ AND FULLY UNDERSTAND THE ABOVE CONSENT TO OPERATION and/or OTHER PROCEDURE.     ____________________________________  _________________________________        ______________________________  Signature of Patient    Signature of Responsible Person                Printed Name of Responsible Person                                      ____________________________________  _____________________________                ________________________________  Signature of Witness        Date  Time         Relationship to Patient    STATEMENT OF PHYSICIAN My signature below affirms that prior to the time of the procedure; I have explained to the patient and/or his/her legal representative, the risks and benefits involved in the proposed treatment and any reasonable alternative to the proposed treatment. I have also explained the risks and benefits involved in refusal of the proposed treatment and alternatives to the proposed  treatment and have answered the patient's questions. If I have a significant financial interest in a co-management agreement or a significant financial interest in any product or implant, or other significant relationship used in this procedure/surgery, I have disclosed this and had a discussion with my patient.     _____________________________________________________              _____________________________  (Signature of Physician)                                                                                         (Date)                                   (Time)  Patient Name: Be Howell Santhosh Bueno      : 1995      Printed: 2025     Medical Record #: [id number]                                      Page 1 of 1

## (undated) NOTE — LETTER
Date: 8/14/2024    Patient Name: Be Bueno          To Whom it may concern:    This letter has been written at the patient's request. The above patient underwent lumbar spine surgery at the [facility] on 8/2/24.     This patient may return to work on light-duty, effective immediately. Please observe the following restrictions for this patient as she continues to recover from surgery:  No lifting greater than 5 pounds  Minimize bending, lifting, twisting, and overhead reaching     The patient's activity status will be continuously re-evaluated at her upcoming post-operative follow up visits.     If there are any additional questions, please do not hesitate to contact our office.         Sincerely,        MARIA M Goldberg   [facility]  Neurosurgery   [address] [place] [address], [place] [address]  [phone]

## (undated) NOTE — LETTER
[address]  [address]  Authorization for Surgical Operation and Procedure     Date:___________                                                                                                         Time:__________  I hereby authorize Surgeon(s):  CATHY Byrne DO, my physician and his/her assistants (if applicable), which may include medical students, residents, and/or fellows, to perform the following surgical operation/ procedure and administer such anesthesia as may be determined necessary by my physician:  Operation/Procedure name (s) Procedure(s):  RIGHT LUMBAR 4-LUMBAR 5, LUMBAR 5-SACRAL 1 [facility]   2.   I recognize that during the surgical operation/procedure, unforeseen conditions may necessitate additional or different procedures than those listed above.  I, therefore, further authorize and request that the above-named surgeon, assistants, or designees perform such procedures as are, in their judgment, necessary and desirable.    3.   My surgeon/physician has discussed prior to my surgery the potential benefits, risks and side effects of this procedure; the likelihood of achieving goals; and potential problems that might occur during recuperation.  They also discussed reasonable alternatives to the procedure, including risks, benefits, and side effects related to the alternatives and risks related to not receiving this procedure.  I have had all my questions answered and I acknowledge that no guarantee has been made as to the result that may be obtained.    4.   Should the need arise during my operation/procedure, which includes change of level of care prior to discharge, I also consent to the administration of blood and/or blood products.  Further, I understand that despite careful testing and screening of blood or blood products by collecting agencies, I may still be subject to ill effects as a result of receiving a blood  transfusion and/or blood products.  The following are some, but not all, of the potential risks that can occur: fever and allergic reactions, hemolytic reactions, transmission of diseases such as Hepatitis, AIDS and Cytomegalovirus (CMV) and fluid overload.  In the event that I wish to have an autologous transfusion of my own blood, or a directed donor transfusion, I will discuss this with my physician.  Check only if Refusing Blood or Blood Products  I understand refusal of blood or blood products as deemed necessary by my physician may have serious consequences to my condition to include possible death. I hereby assume responsibility for my refusal and release the hospital, its personnel, and my physicians from any responsibility for the consequences of my refusal.          o  Refuse      5.   I authorize the use of any specimen, organs, tissues, body parts or foreign objects that may be removed from my body during the operation/procedure for diagnosis, research or teaching purposes and their subsequent disposal by hospital authorities.  I also authorize the release of specimen test results and/or written reports to my treating physician on the hospital medical staff or other referring or consulting physicians involved in my care, at the discretion of the Pathologist or my treating physician.    6.   I consent to the photographing or videotaping of the operations or procedures to be performed, including appropriate portions of my body for medical, scientific, or educational purposes, provided my identity is not revealed by the pictures or by descriptive texts accompanying them.  If the procedure has been photographed/videotaped, the surgeon will obtain the original picture, image, videotape or CD.  The hospital will not be responsible for storage, release or maintenance of the picture, image, tape or CD.    7.   I consent to the presence of a  or observers in the operating room as deemed  necessary by my physician or their designees.    8.   I recognize that in the event my procedure results in extended X-Ray/fluoroscopy time, I may develop a skin reaction.    9. If I have a Do Not Attempt Resuscitation (DNAR) order in place, that status will be suspended while in the operating room, procedural suite, and during the recovery period unless otherwise explicitly stated by me (or a person authorized to consent on my behalf). The surgeon or my attending physician will determine when the applicable recovery period ends for purposes of reinstating the DNAR order.  10. Patients having a sterilization procedure: I understand that if the procedure is successful the results will be permanent and it will therefore be impossible for me to inseminate, conceive, or bear children.  I also understand that the procedure is intended to result in sterility, although the result has not been guaranteed.   11. I acknowledge that my physician has explained sedation/analgesia administration to me including the risk and benefits I consent to the administration of sedation/analgesia as may be necessary or desirable in the judgment of my physician.    I CERTIFY THAT I HAVE READ AND FULLY UNDERSTAND THE ABOVE CONSENT TO OPERATION and/or OTHER PROCEDURE.    _________________________________________  __________________________________  Signature of Patient     Signature of Responsible Person         ___________________________________         Printed Name of Responsible Person           _________________________________                 Relationship to Patient  _________________________________________  ______________________________  Signature of Witness          Date  Time      Patient Name: Be Trevizo Ximena     : 1995                 Printed: 2024     Medical Record #: [id number]                     Page 1 of [address] [place] [address]   [id number]    Consent for Anesthesia    IBe agree to be cared for by an anesthesiologist, who is specially trained to monitor me and give me medicine to put me to sleep or keep me comfortable during my procedure    I understand that my anesthesiologist is not an employee or agent of [facility] or [employer]. He or she works for [employer].    As the patient asking for anesthesia services, I agree to:  Allow the anesthesiologist (anesthesia doctor) to give me medicine and do additional procedures as necessary. Some examples are: Starting or using an “IV” to give me medicine, fluids or blood during my procedure, and having a breathing tube placed to help me breathe when I’m asleep (intubation). In the event that my heart stops working properly, I understand that my anesthesiologist will make every effort to sustain my life, unless otherwise directed by [facility] Do Not Resuscitate documents.  Tell my anesthesia doctor before my procedure:  If I am pregnant.  The last time that I ate or drank.  All of the medicines I take (including prescriptions, herbal supplements, and pills I can buy without a prescription (including street drugs/illegal medications). Failure to inform my anesthesiologist about these medicines may increase my risk of anesthetic complications.  If I am allergic to anything or have had a reaction to anesthesia before.  I understand how the anesthesia medicine will help me (benefits).  I understand that with any type of anesthesia medicine there are risks:  The most common risks are: nausea, vomiting, sore throat, muscle soreness, damage to my eyes, mouth, or teeth (from breathing tube placement).  Rare risks include: remembering what happened during my procedure, allergic reactions to medications, injury to my airway, heart, lungs, vision, nerves, or muscles and in extremely rare instances death.  My doctor has explained to me other  choices available to me for my care (alternatives).  Pregnant Patients (“epidural”):  I understand that the risks of having an epidural (medicine given into my back to help control pain during labor), include itching, low blood pressure, difficulty urinating, headache or slowing of the baby’s heart. Very rare risks include infection, bleeding, seizure, irregular heart rhythms and nerve injury.  Regional Anesthesia (“spinal”, “epidural”, & “nerve blocks”):  I understand that rare but potential complications include headache, bleeding, infection, seizure, irregular heart rhythms, and nerve injury.    I can change my mind about having anesthesia services at any time before I get the medicine.    _____________________________________________________________________________  Patient (or Representative) Signature/Relationship to Patient  Date   Time    _____________________________________________________________________________   Name (if used)    Language/Organization   Time    _____________________________________________________________________________  Anesthesiologist Signature     Date   Time  I have discussed the procedure and information above with the patient (or patient’s representative) and answered their questions. The patient or their representative has agreed to have anesthesia services.    _____________________________________________________________________________  Witness        Date   Time  I have verified that the signature is that of the patient or patient’s representative, and that it was signed before the procedure  Patient Name: Be Bueno     : 1995                 Printed: 2024     Medical Record #: [id number]                     Page 2 of 2

## (undated) NOTE — LETTER
24          Be Bueno  :  1995      To Whom it may concern:    This letter has been written at the patient's request. The above patient underwent lumbar spine surgery at the [facility] on 24.    This patient should be excused from attending work through 24. Patient's return to work status will be re-evaluated at her next post-operative follow up visit.    If there are any additional questions, please do not hesitate to contact our office.         Sincerely,        MARIA M Goldberg   [facility] [place] [facility]  Neurosurgery   [address] [place] [address], [address]  [place] [address]  [phone]